# Patient Record
Sex: FEMALE | Race: OTHER | NOT HISPANIC OR LATINO
[De-identification: names, ages, dates, MRNs, and addresses within clinical notes are randomized per-mention and may not be internally consistent; named-entity substitution may affect disease eponyms.]

---

## 2017-03-28 ENCOUNTER — APPOINTMENT (OUTPATIENT)
Dept: FAMILY MEDICINE | Facility: CLINIC | Age: 52
End: 2017-03-28

## 2017-03-28 VITALS — SYSTOLIC BLOOD PRESSURE: 122 MMHG | DIASTOLIC BLOOD PRESSURE: 90 MMHG

## 2017-03-28 DIAGNOSIS — R53.81 OTHER MALAISE: ICD-10-CM

## 2017-03-29 LAB
ALBUMIN SERPL ELPH-MCNC: 4.6 G/DL
ALP BLD-CCNC: 76 U/L
ALT SERPL-CCNC: 22 U/L
ANION GAP SERPL CALC-SCNC: 19 MMOL/L
AST SERPL-CCNC: 18 U/L
BILIRUB SERPL-MCNC: 0.8 MG/DL
BUN SERPL-MCNC: 12 MG/DL
CALCIUM SERPL-MCNC: 10.3 MG/DL
CHLORIDE SERPL-SCNC: 103 MMOL/L
CO2 SERPL-SCNC: 25 MMOL/L
CREAT SERPL-MCNC: 0.8 MG/DL
GLUCOSE SERPL-MCNC: 136 MG/DL
LDLC SERPL DIRECT ASSAY-MCNC: 149 MG/DL
POTASSIUM SERPL-SCNC: 5.1 MMOL/L
PROT SERPL-MCNC: 7.5 G/DL
SODIUM SERPL-SCNC: 147 MMOL/L
TSH SERPL-ACNC: 1.5 UIU/ML

## 2017-07-31 ENCOUNTER — APPOINTMENT (OUTPATIENT)
Dept: FAMILY MEDICINE | Facility: CLINIC | Age: 52
End: 2017-07-31
Payer: COMMERCIAL

## 2017-07-31 VITALS
HEIGHT: 67 IN | DIASTOLIC BLOOD PRESSURE: 86 MMHG | SYSTOLIC BLOOD PRESSURE: 124 MMHG | BODY MASS INDEX: 27.31 KG/M2 | WEIGHT: 174 LBS

## 2017-07-31 PROCEDURE — 99213 OFFICE O/P EST LOW 20 MIN: CPT

## 2017-08-11 ENCOUNTER — EMERGENCY (EMERGENCY)
Facility: HOSPITAL | Age: 52
LOS: 0 days | Discharge: HOME | End: 2017-08-11
Attending: EMERGENCY MEDICINE
Payer: COMMERCIAL

## 2017-08-11 VITALS
WEIGHT: 171.96 LBS | HEART RATE: 68 BPM | SYSTOLIC BLOOD PRESSURE: 135 MMHG | RESPIRATION RATE: 16 BRPM | OXYGEN SATURATION: 100 % | TEMPERATURE: 99 F | DIASTOLIC BLOOD PRESSURE: 90 MMHG | HEIGHT: 67 IN
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
## 2017-08-11 DIAGNOSIS — S39.012A STRAIN OF MUSCLE, FASCIA AND TENDON OF LOWER BACK, INITIAL ENCOUNTER: ICD-10-CM

## 2017-08-11 DIAGNOSIS — Z98.890 OTHER SPECIFIED POSTPROCEDURAL STATES: Chronic | ICD-10-CM

## 2017-08-11 DIAGNOSIS — M54.9 DORSALGIA, UNSPECIFIED: ICD-10-CM

## 2017-08-11 PROCEDURE — 99284 EMERGENCY DEPT VISIT MOD MDM: CPT

## 2017-08-11 PROCEDURE — 72100 X-RAY EXAM L-S SPINE 2/3 VWS: CPT | Mod: 26

## 2017-08-11 RX ORDER — ACETAMINOPHEN 500 MG
650 TABLET ORAL ONCE
Qty: 0 | Refills: 0 | Status: COMPLETED | OUTPATIENT
Start: 2017-08-11 | End: 2017-08-11

## 2017-08-11 RX ADMIN — Medication 650 MILLIGRAM(S): at 19:12

## 2017-10-22 ENCOUNTER — APPOINTMENT (OUTPATIENT)
Dept: FAMILY MEDICINE | Facility: CLINIC | Age: 52
End: 2017-10-22

## 2017-11-03 ENCOUNTER — APPOINTMENT (OUTPATIENT)
Dept: FAMILY MEDICINE | Facility: CLINIC | Age: 52
End: 2017-11-03
Payer: COMMERCIAL

## 2017-11-03 VITALS
DIASTOLIC BLOOD PRESSURE: 80 MMHG | SYSTOLIC BLOOD PRESSURE: 122 MMHG | WEIGHT: 173 LBS | BODY MASS INDEX: 27.15 KG/M2 | HEIGHT: 67 IN

## 2017-11-03 PROCEDURE — G0008: CPT

## 2017-11-03 PROCEDURE — 36415 COLL VENOUS BLD VENIPUNCTURE: CPT

## 2017-11-03 PROCEDURE — 90688 IIV4 VACCINE SPLT 0.5 ML IM: CPT

## 2017-11-03 PROCEDURE — 99214 OFFICE O/P EST MOD 30 MIN: CPT | Mod: 25

## 2017-11-03 RX ORDER — NAPROXEN 500 MG/1
500 TABLET ORAL
Qty: 60 | Refills: 0 | Status: DISCONTINUED | COMMUNITY
Start: 2017-08-16

## 2017-11-03 RX ORDER — FLUOCINONIDE 0.05 MG/G
0.05 OINTMENT TOPICAL
Qty: 60 | Refills: 0 | Status: DISCONTINUED | COMMUNITY
Start: 2017-07-14

## 2017-11-03 RX ORDER — AZITHROMYCIN 250 MG/1
250 TABLET, FILM COATED ORAL
Qty: 6 | Refills: 0 | Status: DISCONTINUED | COMMUNITY
Start: 2017-06-03

## 2017-11-04 LAB
ALBUMIN SERPL ELPH-MCNC: 4.6 G/DL
ALP BLD-CCNC: 79 U/L
ALT SERPL-CCNC: 22 U/L
ANION GAP SERPL CALC-SCNC: 14 MMOL/L
AST SERPL-CCNC: 17 U/L
BASOPHILS # BLD AUTO: 0.03 K/UL
BASOPHILS NFR BLD AUTO: 0.4 %
BILIRUB SERPL-MCNC: 1.1 MG/DL
BUN SERPL-MCNC: 13 MG/DL
CALCIUM SERPL-MCNC: 10.2 MG/DL
CHLORIDE SERPL-SCNC: 99 MMOL/L
CHOLEST SERPL-MCNC: 222 MG/DL
CHOLEST/HDLC SERPL: 4 RATIO
CO2 SERPL-SCNC: 26 MMOL/L
CREAT SERPL-MCNC: 0.76 MG/DL
EOSINOPHIL # BLD AUTO: 0.13 K/UL
EOSINOPHIL NFR BLD AUTO: 1.8 %
GLUCOSE SERPL-MCNC: 95 MG/DL
HBA1C MFR BLD HPLC: 5.6 %
HCT VFR BLD CALC: 40.4 %
HDLC SERPL-MCNC: 56 MG/DL
HGB BLD-MCNC: 13.7 G/DL
IMM GRANULOCYTES NFR BLD AUTO: 0.3 %
LDLC SERPL CALC-MCNC: 147 MG/DL
LYMPHOCYTES # BLD AUTO: 2.82 K/UL
LYMPHOCYTES NFR BLD AUTO: 39.5 %
MAN DIFF?: NORMAL
MCHC RBC-ENTMCNC: 31.4 PG
MCHC RBC-ENTMCNC: 33.9 GM/DL
MCV RBC AUTO: 92.4 FL
MONOCYTES # BLD AUTO: 0.57 K/UL
MONOCYTES NFR BLD AUTO: 8 %
NEUTROPHILS # BLD AUTO: 3.57 K/UL
NEUTROPHILS NFR BLD AUTO: 50 %
PLATELET # BLD AUTO: 282 K/UL
POTASSIUM SERPL-SCNC: 4.6 MMOL/L
PROT SERPL-MCNC: 8 G/DL
RBC # BLD: 4.37 M/UL
RBC # FLD: 13 %
SODIUM SERPL-SCNC: 139 MMOL/L
TRIGL SERPL-MCNC: 94 MG/DL
TSH SERPL-ACNC: 1.91 UIU/ML
WBC # FLD AUTO: 7.14 K/UL

## 2018-02-09 ENCOUNTER — MEDICATION RENEWAL (OUTPATIENT)
Age: 53
End: 2018-02-09

## 2018-02-14 ENCOUNTER — APPOINTMENT (OUTPATIENT)
Dept: FAMILY MEDICINE | Facility: CLINIC | Age: 53
End: 2018-02-14

## 2018-04-17 ENCOUNTER — MEDICATION RENEWAL (OUTPATIENT)
Age: 53
End: 2018-04-17

## 2018-04-17 ENCOUNTER — APPOINTMENT (OUTPATIENT)
Dept: FAMILY MEDICINE | Facility: CLINIC | Age: 53
End: 2018-04-17
Payer: COMMERCIAL

## 2018-04-17 VITALS
BODY MASS INDEX: 27.31 KG/M2 | DIASTOLIC BLOOD PRESSURE: 90 MMHG | HEART RATE: 82 BPM | WEIGHT: 174 LBS | OXYGEN SATURATION: 97 % | SYSTOLIC BLOOD PRESSURE: 148 MMHG | HEIGHT: 67 IN

## 2018-04-17 LAB — CYTOLOGY CVX/VAG DOC THIN PREP: NORMAL

## 2018-04-17 PROCEDURE — 99214 OFFICE O/P EST MOD 30 MIN: CPT | Mod: 25

## 2018-04-17 PROCEDURE — 36415 COLL VENOUS BLD VENIPUNCTURE: CPT

## 2018-04-17 RX ORDER — AMOXICILLIN AND CLAVULANATE POTASSIUM 875; 125 MG/1; MG/1
875-125 TABLET, COATED ORAL
Qty: 20 | Refills: 0 | Status: DISCONTINUED | COMMUNITY
Start: 2018-03-26

## 2018-04-18 LAB
T4 FREE SERPL-MCNC: 1.5 NG/DL
TSH SERPL-ACNC: 2.11 UIU/ML

## 2018-05-01 ENCOUNTER — APPOINTMENT (OUTPATIENT)
Dept: FAMILY MEDICINE | Facility: CLINIC | Age: 53
End: 2018-05-01
Payer: COMMERCIAL

## 2018-05-01 VITALS
BODY MASS INDEX: 27.31 KG/M2 | SYSTOLIC BLOOD PRESSURE: 145 MMHG | WEIGHT: 174 LBS | DIASTOLIC BLOOD PRESSURE: 105 MMHG | HEIGHT: 67 IN

## 2018-05-01 PROCEDURE — 99214 OFFICE O/P EST MOD 30 MIN: CPT

## 2018-05-02 ENCOUNTER — APPOINTMENT (OUTPATIENT)
Dept: FAMILY MEDICINE | Facility: CLINIC | Age: 53
End: 2018-05-02

## 2018-05-02 ENCOUNTER — APPOINTMENT (OUTPATIENT)
Dept: FAMILY MEDICINE | Facility: CLINIC | Age: 53
End: 2018-05-02
Payer: COMMERCIAL

## 2018-05-02 VITALS
DIASTOLIC BLOOD PRESSURE: 80 MMHG | OXYGEN SATURATION: 97 % | HEIGHT: 67 IN | HEART RATE: 82 BPM | WEIGHT: 174 LBS | RESPIRATION RATE: 16 BRPM | SYSTOLIC BLOOD PRESSURE: 130 MMHG | BODY MASS INDEX: 27.31 KG/M2

## 2018-05-02 PROCEDURE — 99213 OFFICE O/P EST LOW 20 MIN: CPT

## 2018-05-14 ENCOUNTER — RX RENEWAL (OUTPATIENT)
Age: 53
End: 2018-05-14

## 2018-06-08 ENCOUNTER — MEDICATION RENEWAL (OUTPATIENT)
Age: 53
End: 2018-06-08

## 2018-07-20 RX ORDER — VALSARTAN 80 MG/1
80 TABLET, COATED ORAL
Qty: 90 | Refills: 1 | Status: DISCONTINUED | COMMUNITY
Start: 2018-05-01 | End: 2018-07-20

## 2018-08-03 ENCOUNTER — APPOINTMENT (OUTPATIENT)
Dept: FAMILY MEDICINE | Facility: CLINIC | Age: 53
End: 2018-08-03
Payer: COMMERCIAL

## 2018-08-03 VITALS
OXYGEN SATURATION: 98 % | WEIGHT: 173 LBS | BODY MASS INDEX: 27.15 KG/M2 | HEART RATE: 80 BPM | TEMPERATURE: 98.7 F | DIASTOLIC BLOOD PRESSURE: 100 MMHG | HEIGHT: 67 IN | SYSTOLIC BLOOD PRESSURE: 130 MMHG

## 2018-08-03 PROCEDURE — 99214 OFFICE O/P EST MOD 30 MIN: CPT

## 2018-08-03 NOTE — ASSESSMENT
[FreeTextEntry1] : The patient has a diagnosis of hypertension. Blood work was drawn in office and will be followed.  The diagnosis was discussed with patient and need for medication compliance and possible side affects and risks of noncompliance. Patient was told to adhere to a low salt diet and try to incorporate exercise daily.\par slightly elevated, will increase losartan to 100 and reassess in 3 weeks\par cardio referral\par \par anxiety - doing better - uses xanax only as needed\par \par reflux doing better

## 2018-08-03 NOTE — HISTORY OF PRESENT ILLNESS
[FreeTextEntry8] : 52 year old female here for blood pressure check and management after changing from valsartan to losartan.  patient also doing okay with xanax. Patient was placed on valsartan and told to stop the metoprolol. Patients active medications, allergies and issues were all reviewed with the patient at time of visit.\par

## 2018-08-03 NOTE — HEALTH RISK ASSESSMENT
[No falls in past year] : Patient reported no falls in the past year [0] : 2) Feeling down, depressed, or hopeless: Not at all (0) [] : No [[id number]] : 0

## 2018-08-03 NOTE — PHYSICAL EXAM
[Well Nourished] : well nourished [Normal Oropharynx] : the oropharynx was normal [Clear to Auscultation] : lungs were clear to auscultation bilaterally [Regular Rhythm] : with a regular rhythm [Soft] : abdomen soft [Non Tender] : non-tender [No CVA Tenderness] : no CVA  tenderness [No Joint Swelling] : no joint swelling [Normal Gait] : normal gait

## 2018-10-17 ENCOUNTER — APPOINTMENT (OUTPATIENT)
Dept: FAMILY MEDICINE | Facility: CLINIC | Age: 53
End: 2018-10-17
Payer: COMMERCIAL

## 2018-10-17 VITALS
SYSTOLIC BLOOD PRESSURE: 130 MMHG | WEIGHT: 173 LBS | RESPIRATION RATE: 16 BRPM | HEIGHT: 67 IN | HEART RATE: 114 BPM | OXYGEN SATURATION: 97 % | BODY MASS INDEX: 27.15 KG/M2 | DIASTOLIC BLOOD PRESSURE: 82 MMHG

## 2018-10-17 LAB
ALBUMIN SERPL ELPH-MCNC: 5 G/DL
ALP BLD-CCNC: 75 U/L
ALT SERPL-CCNC: 20 U/L
ANION GAP SERPL CALC-SCNC: 13 MMOL/L
AST SERPL-CCNC: 17 U/L
BASOPHILS # BLD AUTO: 0.02 K/UL
BASOPHILS NFR BLD AUTO: 0.3 %
BILIRUB SERPL-MCNC: 1.1 MG/DL
BUN SERPL-MCNC: 12 MG/DL
CALCIUM SERPL-MCNC: 10.9 MG/DL
CHLORIDE SERPL-SCNC: 102 MMOL/L
CHOLEST SERPL-MCNC: 211 MG/DL
CHOLEST/HDLC SERPL: 3.7 RATIO
CO2 SERPL-SCNC: 26 MMOL/L
CREAT SERPL-MCNC: 0.75 MG/DL
EOSINOPHIL # BLD AUTO: 0.04 K/UL
EOSINOPHIL NFR BLD AUTO: 0.5 %
GLUCOSE SERPL-MCNC: 101 MG/DL
HBA1C MFR BLD HPLC: 5.7 %
HCT VFR BLD CALC: 42.1 %
HDLC SERPL-MCNC: 57 MG/DL
HGB BLD-MCNC: 14.1 G/DL
IMM GRANULOCYTES NFR BLD AUTO: 0.1 %
LDLC SERPL CALC-MCNC: 136 MG/DL
LYMPHOCYTES # BLD AUTO: 2.06 K/UL
LYMPHOCYTES NFR BLD AUTO: 27.7 %
MAN DIFF?: NORMAL
MCHC RBC-ENTMCNC: 31.4 PG
MCHC RBC-ENTMCNC: 33.5 GM/DL
MCV RBC AUTO: 93.8 FL
MONOCYTES # BLD AUTO: 0.47 K/UL
MONOCYTES NFR BLD AUTO: 6.3 %
NEUTROPHILS # BLD AUTO: 4.83 K/UL
NEUTROPHILS NFR BLD AUTO: 65.1 %
PLATELET # BLD AUTO: 304 K/UL
POTASSIUM SERPL-SCNC: 4.3 MMOL/L
PROT SERPL-MCNC: 7.8 G/DL
RBC # BLD: 4.49 M/UL
RBC # FLD: 12.9 %
SODIUM SERPL-SCNC: 141 MMOL/L
TRIGL SERPL-MCNC: 90 MG/DL
TSH SERPL-ACNC: 2.35 UIU/ML
WBC # FLD AUTO: 7.43 K/UL

## 2018-10-17 PROCEDURE — 99214 OFFICE O/P EST MOD 30 MIN: CPT

## 2018-10-17 RX ORDER — AMLODIPINE BESYLATE 5 MG/1
5 TABLET ORAL
Qty: 21 | Refills: 0 | Status: DISCONTINUED | COMMUNITY
Start: 2018-10-03 | End: 2018-10-17

## 2018-10-17 NOTE — ASSESSMENT
[FreeTextEntry1] : The patient has a diagnosis of hypertension. Blood work was drawn in office and will be followed.  The diagnosis was discussed with patient and need for medication compliance and possible side affects and risks of noncompliance. Patient was told to adhere to a low salt diet and try to incorporate exercise daily.\par blood pressure well controlled\par doing well\par cardio referral\par \par anxiety - doing better - uses xanax only as needed\par \par reflux doing better

## 2018-10-17 NOTE — HEALTH RISK ASSESSMENT
[] : No [No falls in past year] : Patient reported no falls in the past year [0] : 2) Feeling down, depressed, or hopeless: Not at all (0) [[id number]] : 0

## 2018-10-17 NOTE — HISTORY OF PRESENT ILLNESS
[FreeTextEntry8] : 52 year old female here for blood pressure check and management after changing from valsartan to losartan.  patient also doing okay with xanax. patient did not like the amlodipin, made her dizzy. Patient was placed on valsartan and told to stop the metoprolol. Patients active medications, allergies and issues were all reviewed with the patient at time of visit.\par

## 2018-11-05 ENCOUNTER — LABORATORY RESULT (OUTPATIENT)
Age: 53
End: 2018-11-05

## 2018-11-05 ENCOUNTER — APPOINTMENT (OUTPATIENT)
Dept: FAMILY MEDICINE | Facility: CLINIC | Age: 53
End: 2018-11-05
Payer: COMMERCIAL

## 2018-11-05 VITALS
OXYGEN SATURATION: 99 % | RESPIRATION RATE: 16 BRPM | HEIGHT: 67 IN | HEART RATE: 92 BPM | SYSTOLIC BLOOD PRESSURE: 140 MMHG | BODY MASS INDEX: 25.43 KG/M2 | DIASTOLIC BLOOD PRESSURE: 76 MMHG | WEIGHT: 162 LBS

## 2018-11-05 DIAGNOSIS — Z92.29 PERSONAL HISTORY OF OTHER DRUG THERAPY: ICD-10-CM

## 2018-11-05 PROCEDURE — G0008: CPT

## 2018-11-05 PROCEDURE — 99214 OFFICE O/P EST MOD 30 MIN: CPT | Mod: 25

## 2018-11-05 PROCEDURE — 90686 IIV4 VACC NO PRSV 0.5 ML IM: CPT

## 2018-11-07 RX ORDER — ESCITALOPRAM OXALATE 10 MG/1
10 TABLET ORAL
Qty: 30 | Refills: 0 | Status: DISCONTINUED | COMMUNITY
Start: 2018-11-05 | End: 2018-11-07

## 2018-11-09 RX ORDER — SERTRALINE HYDROCHLORIDE 50 MG/1
50 TABLET, FILM COATED ORAL
Qty: 30 | Refills: 0 | Status: DISCONTINUED | COMMUNITY
Start: 2018-11-07 | End: 2018-11-09

## 2018-11-09 NOTE — PHYSICAL EXAM
[Well Nourished] : well nourished [Normal Oropharynx] : the oropharynx was normal [Clear to Auscultation] : lungs were clear to auscultation bilaterally [Regular Rhythm] : with a regular rhythm [Soft] : abdomen soft [Non Tender] : non-tender [No CVA Tenderness] : no CVA  tenderness [No Joint Swelling] : no joint swelling [Normal Gait] : normal gait [Normal Insight/Judgement] : insight and judgment were intact [de-identified] : anxious

## 2018-11-09 NOTE — HISTORY OF PRESENT ILLNESS
[FreeTextEntry8] : 52 year old female here for blood pressure check and management after changing from valsartan to losartan. Patient extremely nervous all day long.  patient using xanax as needed.  patient did not like the amlodipin, made her dizzy. Patient was placed on valsartan and told to stop the metoprolol. Patients active medications, allergies and issues were all reviewed with the patient at time of visit.\par

## 2018-11-09 NOTE — ASSESSMENT
[FreeTextEntry1] : The patient has a diagnosis of hypertension. Blood work was drawn in office and will be followed.  The diagnosis was discussed with patient and need for medication compliance and possible side affects and risks of noncompliance. Patient was told to adhere to a low salt diet and try to incorporate exercise daily.\par blood pressure well controlled\par doing well\par cardio referral\par \par Discussed diagnosis of anxiety and depression with the patient and potential outcomes/side affects of treatment versus non treatment. Medications were assessed and described at length. Side affects and black box warning were discussed.  Patient was advised to continue will all medications prescribed and the need for compliance was discussed and emphasized. Patient was advised to not stop medications without discussing with a health care provider first. Patient was advised to continue psychotherapy or seek therapy if not currently attending. Patient was educated on addictive potential of controlled substances and was counseled to use only as needed and sparingly. Patient verbalized understanding of all the above.\par patient extremely anxious\par will start lexapro and reassess in 4 weeks\par \par reflux doing better\par \par Patient was given a vaccine and was counseled regarding all side affects. Patient was advised to ice the area if necessary if it is tender or red. Patient was told to return to office if has any fever, nausea, vomiting or increased pain.\par

## 2018-12-03 ENCOUNTER — APPOINTMENT (OUTPATIENT)
Dept: FAMILY MEDICINE | Facility: CLINIC | Age: 53
End: 2018-12-03

## 2019-05-01 ENCOUNTER — APPOINTMENT (OUTPATIENT)
Dept: FAMILY MEDICINE | Facility: CLINIC | Age: 54
End: 2019-05-01
Payer: COMMERCIAL

## 2019-05-01 ENCOUNTER — NON-APPOINTMENT (OUTPATIENT)
Age: 54
End: 2019-05-01

## 2019-05-01 ENCOUNTER — LABORATORY RESULT (OUTPATIENT)
Age: 54
End: 2019-05-01

## 2019-05-01 VITALS
WEIGHT: 160 LBS | BODY MASS INDEX: 25.11 KG/M2 | DIASTOLIC BLOOD PRESSURE: 80 MMHG | HEIGHT: 67 IN | SYSTOLIC BLOOD PRESSURE: 130 MMHG

## 2019-05-01 DIAGNOSIS — K29.60 OTHER GASTRITIS W/OUT BLEEDING: ICD-10-CM

## 2019-05-01 LAB
25(OH)D3 SERPL-MCNC: 29.9 NG/ML
ALBUMIN SERPL ELPH-MCNC: 4.9 G/DL
ALP BLD-CCNC: 96 U/L
ALT SERPL-CCNC: 46 U/L
ANION GAP SERPL CALC-SCNC: 11 MMOL/L
AST SERPL-CCNC: 24 U/L
BILIRUB SERPL-MCNC: 1 MG/DL
BILIRUB UR QL STRIP: NEGATIVE
BUN SERPL-MCNC: 10 MG/DL
CALCIUM SERPL-MCNC: 11.2 MG/DL
CHLORIDE SERPL-SCNC: 101 MMOL/L
CHOLEST SERPL-MCNC: 218 MG/DL
CHOLEST/HDLC SERPL: 4.2 RATIO
CO2 SERPL-SCNC: 28 MMOL/L
CREAT SERPL-MCNC: 0.71 MG/DL
ESTIMATED AVERAGE GLUCOSE: 114 MG/DL
GLUCOSE SERPL-MCNC: 87 MG/DL
GLUCOSE UR-MCNC: NEGATIVE
HBA1C MFR BLD HPLC: 5.6 %
HCG UR QL: 0.2 EU/DL
HDLC SERPL-MCNC: 52 MG/DL
HGB UR QL STRIP.AUTO: NEGATIVE
KETONES UR-MCNC: NEGATIVE
LDLC SERPL CALC-MCNC: 145 MG/DL
LEUKOCYTE ESTERASE UR QL STRIP: NEGATIVE
NITRITE UR QL STRIP: NEGATIVE
PH UR STRIP: 7
POTASSIUM SERPL-SCNC: 4.7 MMOL/L
PROT SERPL-MCNC: 8.1 G/DL
PROT UR STRIP-MCNC: NEGATIVE
SODIUM SERPL-SCNC: 140 MMOL/L
SP GR UR STRIP: 1.01
TRIGL SERPL-MCNC: 105 MG/DL
TSH SERPL-ACNC: 0.5 UIU/ML

## 2019-05-01 PROCEDURE — 99396 PREV VISIT EST AGE 40-64: CPT | Mod: 25

## 2019-05-01 PROCEDURE — 99497 ADVNCD CARE PLAN 30 MIN: CPT

## 2019-05-01 PROCEDURE — 93000 ELECTROCARDIOGRAM COMPLETE: CPT

## 2019-05-01 PROCEDURE — 36415 COLL VENOUS BLD VENIPUNCTURE: CPT

## 2019-05-01 RX ORDER — VENLAFAXINE 75 MG/1
75 TABLET ORAL
Qty: 30 | Refills: 0 | Status: DISCONTINUED | COMMUNITY
Start: 2018-11-09 | End: 2019-05-01

## 2019-05-01 NOTE — PHYSICAL EXAM
[Well Nourished] : well nourished [Normal Oropharynx] : the oropharynx was normal [Clear to Auscultation] : lungs were clear to auscultation bilaterally [Soft] : abdomen soft [Regular Rhythm] : with a regular rhythm [No CVA Tenderness] : no CVA  tenderness [No Joint Swelling] : no joint swelling [Non Tender] : non-tender [Normal Insight/Judgement] : insight and judgment were intact [Normal Gait] : normal gait [de-identified] : anxious

## 2019-05-01 NOTE — HEALTH RISK ASSESSMENT
[Good] : ~his/her~  mood as  good [No falls in past year] : Patient reported no falls in the past year [0] : 2) Feeling down, depressed, or hopeless: Not at all (0) [Patient reported mammogram was normal] : Patient reported mammogram was normal [Patient reported colonoscopy was normal] : Patient reported colonoscopy was normal [Alone] : lives alone [Employed] : employed [# Of Children ___] : has [unfilled] children [Single] : single [Fully functional (bathing, dressing, toileting, transferring, walking, feeding)] : Fully functional (bathing, dressing, toileting, transferring, walking, feeding) [Fully functional (using the telephone, shopping, preparing meals, housekeeping, doing laundry, using] : Fully functional and needs no help or supervision to perform IADLs (using the telephone, shopping, preparing meals, housekeeping, doing laundry, using transportation, managing medications and managing finances) [Smoke Detector] : smoke detector [Seat Belt] :  uses seat belt [With Patient/Caregiver] : With Patient/Caregiver [] : No [[id number]] : 0 [MammogramDate] : 7/2018 [PapSmearDate] : 7/2018 [ColonoscopyComments] : fir test 2018 [de-identified] :  [AdvancecareDate] : 05/01/2019 [FreeTextEntry4] : patient does not have health care proxy\par will consider later on

## 2019-05-01 NOTE — HISTORY OF PRESENT ILLNESS
[FreeTextEntry8] : 53 year old female  here for annual well visit. Patient's blood work was drawn and medications reviewed. Patient's past medical history was reviewed, allergies verified and problems were identified and assessed. Patients medications were reviewed. Patient is feeling well with no new or active complaints at this time.\par \par Patient  patient using xanax as needed.  patient did not like the amlodipin, made her dizzy. Patient was placed on valsartan and told to stop the metoprolol. Patients active medications, allergies and issues were all reviewed with the patient at time of visit.\par

## 2019-05-01 NOTE — ASSESSMENT
[FreeTextEntry1] : Patient was counseled on healthy eating habits, on daily exercise and stress relief. All medications and allergies were reviewed with the patient and any adjustments necessary were made. Patient was counseled to try engage in an exercise activity for at least 30 minutes 3-4 times a week.  Patient was advised to eat a diet low in fat and carbohydrates and high in protein, with plenty of vegetables. Patient was advised to try and engage in relaxing activities whenever possible.\par The patients blood was draw in office and will be followed and assessed for any issues.  Patient was told to return to the office if any issues arise.  Unless otherwise stated, the patient is to continue all other medications as previously prescribed.\par \par had fit test, does not want colonoscopy\par \par The patient has a diagnosis of hypertension. Blood work was drawn in office and will be followed.  The diagnosis was discussed with patient and need for medication compliance and possible side affects and risks of noncompliance. Patient was told to adhere to a low salt diet and try to incorporate exercise daily.\par blood pressure well controlled\par doing well\par \par Discussed diagnosis of anxiety and depression with the patient and potential outcomes/side affects of treatment versus non treatment. Medications were assessed and described at length. Side affects and black box warning were discussed.  Patient was advised to continue will all medications prescribed and the need for compliance was discussed and emphasized. Patient was advised to not stop medications without discussing with a health care provider first. Patient was advised to continue psychotherapy or seek therapy if not currently attending. Patient was educated on addictive potential of controlled substances and was counseled to use only as needed and sparingly. Patient verbalized understanding of all the above.\par doing much better\par \par reflux doing better\par \par

## 2019-05-02 LAB
BASOPHILS # BLD AUTO: 0.05 K/UL
BASOPHILS NFR BLD AUTO: 0.6 %
EOSINOPHIL # BLD AUTO: 0.09 K/UL
EOSINOPHIL NFR BLD AUTO: 1 %
HCT VFR BLD CALC: 42.1 %
HGB BLD-MCNC: 13.5 G/DL
IMM GRANULOCYTES NFR BLD AUTO: 0.3 %
LYMPHOCYTES # BLD AUTO: 2.59 K/UL
LYMPHOCYTES NFR BLD AUTO: 28.8 %
MAN DIFF?: NORMAL
MCHC RBC-ENTMCNC: 30.5 PG
MCHC RBC-ENTMCNC: 32.1 GM/DL
MCV RBC AUTO: 95 FL
MONOCYTES # BLD AUTO: 0.73 K/UL
MONOCYTES NFR BLD AUTO: 8.1 %
NEUTROPHILS # BLD AUTO: 5.51 K/UL
NEUTROPHILS NFR BLD AUTO: 61.2 %
PLATELET # BLD AUTO: 307 K/UL
RBC # BLD: 4.43 M/UL
RBC # FLD: 12.4 %
WBC # FLD AUTO: 9 K/UL

## 2019-05-04 ENCOUNTER — TRANSCRIPTION ENCOUNTER (OUTPATIENT)
Age: 54
End: 2019-05-04

## 2019-05-05 LAB
BACTERIA STL CULT: NORMAL
DEPRECATED O AND P PREP STL: NORMAL

## 2019-10-30 ENCOUNTER — APPOINTMENT (OUTPATIENT)
Dept: FAMILY MEDICINE | Facility: CLINIC | Age: 54
End: 2019-10-30
Payer: COMMERCIAL

## 2019-10-30 VITALS
HEIGHT: 67 IN | DIASTOLIC BLOOD PRESSURE: 90 MMHG | SYSTOLIC BLOOD PRESSURE: 139 MMHG | TEMPERATURE: 97.9 F | BODY MASS INDEX: 24.96 KG/M2 | OXYGEN SATURATION: 95 % | HEART RATE: 91 BPM | WEIGHT: 159 LBS

## 2019-10-30 PROCEDURE — G0008: CPT

## 2019-10-30 PROCEDURE — 90674 CCIIV4 VAC NO PRSV 0.5 ML IM: CPT

## 2019-10-30 PROCEDURE — 99214 OFFICE O/P EST MOD 30 MIN: CPT | Mod: 25

## 2019-10-30 NOTE — HEALTH RISK ASSESSMENT
[No falls in past year] : Patient reported no falls in the past year [0] : 2) Feeling down, depressed, or hopeless: Not at all (0) [Good] : ~his/her~  mood as  good [Patient reported mammogram was normal] : Patient reported mammogram was normal [Patient reported colonoscopy was normal] : Patient reported colonoscopy was normal [Alone] : lives alone [Employed] : employed [Single] : single [# Of Children ___] : has [unfilled] children [Fully functional (bathing, dressing, toileting, transferring, walking, feeding)] : Fully functional (bathing, dressing, toileting, transferring, walking, feeding) [Fully functional (using the telephone, shopping, preparing meals, housekeeping, doing laundry, using] : Fully functional and needs no help or supervision to perform IADLs (using the telephone, shopping, preparing meals, housekeeping, doing laundry, using transportation, managing medications and managing finances) [Smoke Detector] : smoke detector [Seat Belt] :  uses seat belt [With Patient/Caregiver] : With Patient/Caregiver [] : No [[id number]] : 0 [MammogramDate] : 7/2019 [PapSmearDate] : 7/2019 [ColonoscopyComments] : fir test 2018 [de-identified] :  [AdvancecareDate] : 05/01/2019 [FreeTextEntry4] : patient does not have health care proxy\par will consider later on

## 2019-10-30 NOTE — HISTORY OF PRESENT ILLNESS
[FreeTextEntry8] : 54 year old female is here for a followup visit. Patient is here for medication renewals and for blood work discussion. Medications and allergies were reviewed and assessed.  There has been no new medications since the last visit. Patient is feeling well with no active changes or issues since Her last visit.\par \par Patient  patient using xanax as needed.  patient did not like the amlodipine, made her dizzy. Patient was placed on valsartan and told to stop the metoprolol. Patients active medications, allergies and issues were all reviewed with the patient at time of visit.\par

## 2019-10-30 NOTE — PHYSICAL EXAM
[Well Nourished] : well nourished [Normal Oropharynx] : the oropharynx was normal [Clear to Auscultation] : lungs were clear to auscultation bilaterally [Regular Rhythm] : with a regular rhythm [Soft] : abdomen soft [Non Tender] : non-tender [No CVA Tenderness] : no CVA  tenderness [No Joint Swelling] : no joint swelling [Normal Gait] : normal gait [Normal Insight/Judgement] : insight and judgment were intact [de-identified] : anxious

## 2019-10-30 NOTE — ASSESSMENT
[FreeTextEntry1] : hypertension\par The patient has a diagnosis of hypertension. The diagnosis was discussed with patient and need for medication compliance and possible side affects and risks of noncompliance. Patient was told to adhere to a low salt diet and try to incorporate exercise daily.\par \par elevated calcium\par went to endo, started on vitamin d, going for bone density\par \par had fit test, does not want colonoscopy\par \par anxiety \par Discussed diagnosis of anxiety with the patient and potential outcomes/side affects of treatment versus non treatment. Medications were assessed and described at length. Side affects and black box warning were discussed.  Patient was advised to continue will all medications prescribed and the need for compliance was discussed and emphasized. Patient was advised to not stop medications without discussing with a health care provider first. Patient was advised to continue psychotherapy or seek therapy if not currently attending. Patient was educated on addictive potential of controlled substances and was counseled to use only as needed and sparingly. Patient verbalized understanding of all the above.\par doing much better\par \par reflux doing better\par \par

## 2020-03-18 ENCOUNTER — RX RENEWAL (OUTPATIENT)
Age: 55
End: 2020-03-18

## 2020-04-21 ENCOUNTER — RX RENEWAL (OUTPATIENT)
Age: 55
End: 2020-04-21

## 2020-04-30 ENCOUNTER — LABORATORY RESULT (OUTPATIENT)
Age: 55
End: 2020-04-30

## 2020-04-30 ENCOUNTER — TRANSCRIPTION ENCOUNTER (OUTPATIENT)
Age: 55
End: 2020-04-30

## 2020-04-30 ENCOUNTER — APPOINTMENT (OUTPATIENT)
Dept: FAMILY MEDICINE | Facility: CLINIC | Age: 55
End: 2020-04-30
Payer: COMMERCIAL

## 2020-04-30 VITALS — SYSTOLIC BLOOD PRESSURE: 150 MMHG | DIASTOLIC BLOOD PRESSURE: 90 MMHG

## 2020-04-30 DIAGNOSIS — B00.1 HERPESVIRAL VESICULAR DERMATITIS: ICD-10-CM

## 2020-04-30 LAB — CYTOLOGY CVX/VAG DOC THIN PREP: NORMAL

## 2020-04-30 PROCEDURE — 99214 OFFICE O/P EST MOD 30 MIN: CPT | Mod: 25

## 2020-04-30 PROCEDURE — 36415 COLL VENOUS BLD VENIPUNCTURE: CPT

## 2020-04-30 NOTE — PHYSICAL EXAM
[Well Nourished] : well nourished [No Acute Distress] : no acute distress [EOMI] : extraocular movements intact [Normal Sclera/Conjunctiva] : normal sclera/conjunctiva [Normal Outer Ear/Nose] : the outer ears and nose were normal in appearance [No Respiratory Distress] : no respiratory distress  [Coordination Grossly Intact] : coordination grossly intact [No Accessory Muscle Use] : no accessory muscle use [Normal Affect] : the affect was normal [Alert and Oriented x3] : oriented to person, place, and time [Normal Insight/Judgement] : insight and judgment were intact [de-identified] : anxious

## 2020-04-30 NOTE — HISTORY OF PRESENT ILLNESS
[FreeTextEntry8] : needs bp chk and work form filled out\par  for school, just had insurance changed\par has been checking bp at home, 137/72\par feeling anxious\par also needs med refills\par had been on metoprolol in the past, states didn't work

## 2020-05-01 DIAGNOSIS — Z86.39 PERSONAL HISTORY OF OTHER ENDOCRINE, NUTRITIONAL AND METABOLIC DISEASE: ICD-10-CM

## 2020-05-01 LAB
ALBUMIN SERPL ELPH-MCNC: 4.8 G/DL
ALP BLD-CCNC: 79 U/L
ALT SERPL-CCNC: 20 U/L
ANION GAP SERPL CALC-SCNC: 10 MMOL/L
AST SERPL-CCNC: 15 U/L
BASOPHILS # BLD AUTO: 0.05 K/UL
BASOPHILS NFR BLD AUTO: 0.6 %
BILIRUB SERPL-MCNC: 0.9 MG/DL
BUN SERPL-MCNC: 11 MG/DL
CALCIUM SERPL-MCNC: 11.1 MG/DL
CHLORIDE SERPL-SCNC: 104 MMOL/L
CHOLEST SERPL-MCNC: 216 MG/DL
CHOLEST/HDLC SERPL: 3.9 RATIO
CO2 SERPL-SCNC: 26 MMOL/L
CREAT SERPL-MCNC: 0.7 MG/DL
EOSINOPHIL # BLD AUTO: 0.07 K/UL
EOSINOPHIL NFR BLD AUTO: 0.9 %
GLUCOSE SERPL-MCNC: 103 MG/DL
HCT VFR BLD CALC: 40.1 %
HDLC SERPL-MCNC: 56 MG/DL
HGB BLD-MCNC: 12.8 G/DL
IMM GRANULOCYTES NFR BLD AUTO: 0.1 %
LDLC SERPL CALC-MCNC: 131 MG/DL
LDLC SERPL DIRECT ASSAY-MCNC: 141 MG/DL
LYMPHOCYTES # BLD AUTO: 2.51 K/UL
LYMPHOCYTES NFR BLD AUTO: 31 %
MAN DIFF?: NORMAL
MCHC RBC-ENTMCNC: 30.6 PG
MCHC RBC-ENTMCNC: 31.9 GM/DL
MCV RBC AUTO: 95.9 FL
MONOCYTES # BLD AUTO: 0.68 K/UL
MONOCYTES NFR BLD AUTO: 8.4 %
NEUTROPHILS # BLD AUTO: 4.77 K/UL
NEUTROPHILS NFR BLD AUTO: 59 %
PLATELET # BLD AUTO: 258 K/UL
POTASSIUM SERPL-SCNC: 4.7 MMOL/L
PROT SERPL-MCNC: 7.4 G/DL
RBC # BLD: 4.18 M/UL
RBC # FLD: 12.2 %
SODIUM SERPL-SCNC: 141 MMOL/L
TRIGL SERPL-MCNC: 141 MG/DL
TSH SERPL-ACNC: 7.68 UIU/ML
WBC # FLD AUTO: 8.09 K/UL

## 2020-05-04 ENCOUNTER — APPOINTMENT (OUTPATIENT)
Dept: FAMILY MEDICINE | Facility: CLINIC | Age: 55
End: 2020-05-04
Payer: MEDICAID

## 2020-05-04 VITALS
HEIGHT: 67 IN | SYSTOLIC BLOOD PRESSURE: 128 MMHG | OXYGEN SATURATION: 97 % | HEART RATE: 110 BPM | DIASTOLIC BLOOD PRESSURE: 86 MMHG

## 2020-05-04 LAB
25(OH)D3 SERPL-MCNC: 50.5 NG/ML
CALCIUM SERPL-MCNC: 11.1 MG/DL
PARATHYROID HORMONE INTACT: 38 PG/ML

## 2020-05-04 PROCEDURE — 99214 OFFICE O/P EST MOD 30 MIN: CPT

## 2020-05-04 NOTE — PHYSICAL EXAM
[Well Nourished] : well nourished [Normal Oropharynx] : the oropharynx was normal [Regular Rhythm] : with a regular rhythm [Clear to Auscultation] : lungs were clear to auscultation bilaterally [Soft] : abdomen soft [No CVA Tenderness] : no CVA  tenderness [Non Tender] : non-tender [Normal Gait] : normal gait [No Joint Swelling] : no joint swelling [Normal Insight/Judgement] : insight and judgment were intact [de-identified] : anxious

## 2020-05-04 NOTE — HISTORY OF PRESENT ILLNESS
[FreeTextEntry8] : 54 year old female is here for a followup visit. Patient is here for medication renewals and for blood work discussion. Medications and allergies were reviewed and assessed.  There has been no new medications since the last visit. Patient is feeling well with no active changes or issues since Her last visit.\par \par Patient  patient using xanax as needed.

## 2020-05-04 NOTE — ASSESSMENT
[FreeTextEntry1] : hypertension\par The patient has a diagnosis of hypertension. The diagnosis was discussed with patient and need for medication compliance and possible side affects and risks of noncompliance. Patient was told to adhere to a low salt diet and try to incorporate exercise daily.\par reevaluated today, better controlled, doing good\par \par elevated calcium/PTH\par reviewed by with patient, PTH high as well\par must fu with endo\par \par THYROID DYSFUNCTION\par Started on synthroid, must be rechecked in 6 weeks\par \par had fit test, does not want colonoscopy\par \par anxiety \par Discussed diagnosis of anxiety with the patient and potential outcomes/side affects of treatment versus non treatment. Medications were assessed and described at length. Side affects and black box warning were discussed.  Patient was advised to continue will all medications prescribed and the need for compliance was discussed and emphasized. Patient was advised to not stop medications without discussing with a health care provider first. Patient was advised to continue psychotherapy or seek therapy if not currently attending. Patient was educated on addictive potential of controlled substances and was counseled to use only as needed and sparingly. Patient verbalized understanding of all the above.\par doing much better\par \par reflux doing better\par \par

## 2020-05-04 NOTE — HEALTH RISK ASSESSMENT
[] : No [No falls in past year] : Patient reported no falls in the past year [0] : 2) Feeling down, depressed, or hopeless: Not at all (0) [[id number]] : 0 [Good] : ~his/her~  mood as  good [Patient reported mammogram was normal] : Patient reported mammogram was normal [Patient reported colonoscopy was normal] : Patient reported colonoscopy was normal [Alone] : lives alone [# Of Children ___] : has [unfilled] children [Employed] : employed [Single] : single [Fully functional (bathing, dressing, toileting, transferring, walking, feeding)] : Fully functional (bathing, dressing, toileting, transferring, walking, feeding) [Fully functional (using the telephone, shopping, preparing meals, housekeeping, doing laundry, using] : Fully functional and needs no help or supervision to perform IADLs (using the telephone, shopping, preparing meals, housekeeping, doing laundry, using transportation, managing medications and managing finances) [Smoke Detector] : smoke detector [Seat Belt] :  uses seat belt [MammogramDate] : 7/2019 [PapSmearDate] : 7/2019 [ColonoscopyComments] : fir test 2018 [de-identified] :  [With Patient/Caregiver] : With Patient/Caregiver [AdvancecareDate] : 05/01/2019 [FreeTextEntry4] : patient does not have health care proxy\par will consider later on

## 2020-06-19 ENCOUNTER — APPOINTMENT (OUTPATIENT)
Dept: FAMILY MEDICINE | Facility: CLINIC | Age: 55
End: 2020-06-19
Payer: MEDICAID

## 2020-06-19 ENCOUNTER — LABORATORY RESULT (OUTPATIENT)
Age: 55
End: 2020-06-19

## 2020-06-19 VITALS
OXYGEN SATURATION: 99 % | SYSTOLIC BLOOD PRESSURE: 120 MMHG | BODY MASS INDEX: 24.96 KG/M2 | HEIGHT: 67 IN | WEIGHT: 159 LBS | TEMPERATURE: 97.7 F | DIASTOLIC BLOOD PRESSURE: 80 MMHG | HEART RATE: 78 BPM

## 2020-06-19 DIAGNOSIS — Z11.59 ENCOUNTER FOR SCREENING FOR OTHER VIRAL DISEASES: ICD-10-CM

## 2020-06-19 LAB
25(OH)D3 SERPL-MCNC: 50.5 NG/ML
APPEARANCE: CLEAR
BASOPHILS # BLD AUTO: 0.05 K/UL
BASOPHILS NFR BLD AUTO: 0.7 %
BILIRUBIN URINE: NEGATIVE
BLOOD URINE: NEGATIVE
COLOR: COLORLESS
EOSINOPHIL # BLD AUTO: 0.12 K/UL
EOSINOPHIL NFR BLD AUTO: 1.8 %
ESTIMATED AVERAGE GLUCOSE: 111 MG/DL
GLUCOSE QUALITATIVE U: NEGATIVE
HBA1C MFR BLD HPLC: 5.5 %
HCT VFR BLD CALC: 39.6 %
HGB BLD-MCNC: 12.4 G/DL
IMM GRANULOCYTES NFR BLD AUTO: 0.1 %
KETONES URINE: NEGATIVE
LEUKOCYTE ESTERASE URINE: NEGATIVE
LYMPHOCYTES # BLD AUTO: 3.01 K/UL
LYMPHOCYTES NFR BLD AUTO: 44 %
MAN DIFF?: NORMAL
MCHC RBC-ENTMCNC: 30.5 PG
MCHC RBC-ENTMCNC: 31.3 GM/DL
MCV RBC AUTO: 97.3 FL
MONOCYTES # BLD AUTO: 0.6 K/UL
MONOCYTES NFR BLD AUTO: 8.8 %
NEUTROPHILS # BLD AUTO: 3.05 K/UL
NEUTROPHILS NFR BLD AUTO: 44.6 %
NITRITE URINE: NEGATIVE
PH URINE: 7
PLATELET # BLD AUTO: 241 K/UL
PROTEIN URINE: NEGATIVE
RBC # BLD: 4.07 M/UL
RBC # FLD: 12.1 %
SPECIFIC GRAVITY URINE: 1.01
TSH SERPL-ACNC: 5.55 UIU/ML
UROBILINOGEN URINE: NORMAL
WBC # FLD AUTO: 6.84 K/UL

## 2020-06-19 PROCEDURE — 99396 PREV VISIT EST AGE 40-64: CPT | Mod: 25

## 2020-06-19 PROCEDURE — 36415 COLL VENOUS BLD VENIPUNCTURE: CPT

## 2020-06-19 NOTE — HISTORY OF PRESENT ILLNESS
[FreeTextEntry8] : 54 year old female  here for annual well visit. Patient's blood work was drawn and medications reviewed. Patient's past medical history was reviewed, allergies verified and problems were identified and assessed. Patients medications were reviewed. Patient is feeling well with no new or active complaints at this time.\par \par \par Patient  patient using xanax as needed.

## 2020-06-19 NOTE — HEALTH RISK ASSESSMENT
[Good] : ~his/her~ current health as good [] : No [Yes] : Yes [No falls in past year] : Patient reported no falls in the past year [[id number]] : 0 [0] : 2) Feeling down, depressed, or hopeless: Not at all (0) [Patient reported colonoscopy was normal] : Patient reported colonoscopy was normal [Alone] : lives alone [Patient reported mammogram was normal] : Patient reported mammogram was normal [Employed] : employed [# Of Children ___] : has [unfilled] children [Single] : single [Smoke Detector] : smoke detector [Fully functional (using the telephone, shopping, preparing meals, housekeeping, doing laundry, using] : Fully functional and needs no help or supervision to perform IADLs (using the telephone, shopping, preparing meals, housekeeping, doing laundry, using transportation, managing medications and managing finances) [Fully functional (bathing, dressing, toileting, transferring, walking, feeding)] : Fully functional (bathing, dressing, toileting, transferring, walking, feeding) [PapSmearDate] : 7/2019 [MammogramDate] : 7/2019 [Seat Belt] :  uses seat belt [de-identified] :  [ColonoscopyComments] : fit test 2018 [With Patient/Caregiver] : With Patient/Caregiver [FreeTextEntry4] : patient does not have health care proxy\par will consider later on [AdvancecareDate] : 05/01/2019

## 2020-06-19 NOTE — PHYSICAL EXAM
[Well Nourished] : well nourished [Normal Oropharynx] : the oropharynx was normal [Clear to Auscultation] : lungs were clear to auscultation bilaterally [Regular Rhythm] : with a regular rhythm [Soft] : abdomen soft [Non Tender] : non-tender [No CVA Tenderness] : no CVA  tenderness [No Joint Swelling] : no joint swelling [Normal Gait] : normal gait [Normal Insight/Judgement] : insight and judgment were intact [de-identified] : anxious

## 2020-06-20 LAB
ALBUMIN SERPL ELPH-MCNC: 4.8 G/DL
ALP BLD-CCNC: 75 U/L
ALT SERPL-CCNC: 24 U/L
ANION GAP SERPL CALC-SCNC: 14 MMOL/L
AST SERPL-CCNC: 19 U/L
BILIRUB SERPL-MCNC: 1 MG/DL
BUN SERPL-MCNC: 14 MG/DL
CALCIUM SERPL-MCNC: 10.8 MG/DL
CALCIUM SERPL-MCNC: 10.8 MG/DL
CHLORIDE SERPL-SCNC: 100 MMOL/L
CHOLEST SERPL-MCNC: 187 MG/DL
CHOLEST/HDLC SERPL: 3.8 RATIO
CO2 SERPL-SCNC: 26 MMOL/L
CREAT SERPL-MCNC: 0.75 MG/DL
GLUCOSE SERPL-MCNC: 91 MG/DL
HDLC SERPL-MCNC: 49 MG/DL
LDLC SERPL CALC-MCNC: 115 MG/DL
PARATHYROID HORMONE INTACT: 57 PG/ML
POTASSIUM SERPL-SCNC: 4.3 MMOL/L
PROT SERPL-MCNC: 6.8 G/DL
SARS-COV-2 IGG SERPL IA-ACNC: 0.01 INDEX
SARS-COV-2 IGG SERPL QL IA: NEGATIVE
SODIUM SERPL-SCNC: 139 MMOL/L
TRIGL SERPL-MCNC: 116 MG/DL

## 2020-08-04 ENCOUNTER — APPOINTMENT (OUTPATIENT)
Dept: FAMILY MEDICINE | Facility: CLINIC | Age: 55
End: 2020-08-04
Payer: MEDICAID

## 2020-08-04 VITALS
OXYGEN SATURATION: 98 % | DIASTOLIC BLOOD PRESSURE: 88 MMHG | SYSTOLIC BLOOD PRESSURE: 120 MMHG | HEART RATE: 97 BPM | HEIGHT: 67 IN

## 2020-08-04 PROCEDURE — 36415 COLL VENOUS BLD VENIPUNCTURE: CPT

## 2020-08-04 PROCEDURE — 99214 OFFICE O/P EST MOD 30 MIN: CPT | Mod: 25

## 2020-08-04 RX ORDER — KETOCONAZOLE 20.5 MG/ML
2 SHAMPOO, SUSPENSION TOPICAL
Qty: 120 | Refills: 0 | Status: DISCONTINUED | COMMUNITY
Start: 2019-11-29 | End: 2020-08-04

## 2020-08-04 RX ORDER — CLINDAMYCIN PHOSPHATE 10 MG/ML
1 SOLUTION TOPICAL
Qty: 60 | Refills: 0 | Status: DISCONTINUED | COMMUNITY
Start: 2020-04-21 | End: 2020-08-04

## 2020-08-04 RX ORDER — OMEPRAZOLE 40 MG/1
40 CAPSULE, DELAYED RELEASE ORAL
Qty: 30 | Refills: 0 | Status: DISCONTINUED | COMMUNITY
Start: 2018-04-17 | End: 2020-08-04

## 2020-08-04 RX ORDER — FLUTICASONE PROPIONATE 50 UG/1
50 SPRAY, METERED NASAL TWICE DAILY
Qty: 1 | Refills: 0 | Status: DISCONTINUED | COMMUNITY
Start: 2020-04-30 | End: 2020-08-04

## 2020-08-04 RX ORDER — FLUOCINONIDE 0.5 MG/ML
0.05 SOLUTION TOPICAL
Qty: 60 | Refills: 0 | Status: DISCONTINUED | COMMUNITY
Start: 2019-11-29 | End: 2020-08-04

## 2020-08-04 RX ORDER — LOSARTAN POTASSIUM AND HYDROCHLOROTHIAZIDE 12.5; 1 MG/1; MG/1
100-12.5 TABLET ORAL DAILY
Qty: 90 | Refills: 0 | Status: DISCONTINUED | COMMUNITY
Start: 2020-04-30 | End: 2020-08-04

## 2020-08-04 RX ORDER — BETAMETHASONE DIPROPIONATE 0.5 MG/G
0.05 LOTION TOPICAL
Qty: 60 | Refills: 0 | Status: DISCONTINUED | COMMUNITY
Start: 2020-02-20 | End: 2020-08-04

## 2020-08-04 NOTE — HEALTH RISK ASSESSMENT
[] : No [Yes] : Yes [No falls in past year] : Patient reported no falls in the past year [0] : 2) Feeling down, depressed, or hopeless: Not at all (0) [[id number]] : 0 [Good] : ~his/her~  mood as  good [Patient reported mammogram was normal] : Patient reported mammogram was normal [Patient reported colonoscopy was normal] : Patient reported colonoscopy was normal [Alone] : lives alone [Employed] : employed [Single] : single [# Of Children ___] : has [unfilled] children [Fully functional (bathing, dressing, toileting, transferring, walking, feeding)] : Fully functional (bathing, dressing, toileting, transferring, walking, feeding) [Smoke Detector] : smoke detector [Fully functional (using the telephone, shopping, preparing meals, housekeeping, doing laundry, using] : Fully functional and needs no help or supervision to perform IADLs (using the telephone, shopping, preparing meals, housekeeping, doing laundry, using transportation, managing medications and managing finances) [MammogramDate] : 7/2019 [Seat Belt] :  uses seat belt [PapSmearDate] : 7/2019 [de-identified] :  [ColonoscopyComments] : fit test 2018 [With Patient/Caregiver] : With Patient/Caregiver [AdvancecareDate] : 05/01/2019 [FreeTextEntry4] : patient does not have health care proxy\par will consider later on

## 2020-08-04 NOTE — HISTORY OF PRESENT ILLNESS
[FreeTextEntry8] : 55 year old female is here for a followup visit. Patient is here for medication renewals and for blood work discussion. Medications and allergies were reviewed and assessed.  There has been no new medications since the last visit. Patient is feeling well with no active changes or issues since Her last visit.\par \par Patient  patient using xanax as needed.

## 2020-08-04 NOTE — PHYSICAL EXAM
[Well Nourished] : well nourished [Normal Oropharynx] : the oropharynx was normal [Clear to Auscultation] : lungs were clear to auscultation bilaterally [Regular Rhythm] : with a regular rhythm [Non Tender] : non-tender [Soft] : abdomen soft [Normal Gait] : normal gait [Normal Insight/Judgement] : insight and judgment were intact [de-identified] : anxious

## 2020-08-05 LAB
CHOLEST SERPL-MCNC: 222 MG/DL
CHOLEST/HDLC SERPL: 3.9 RATIO
HDLC SERPL-MCNC: 57 MG/DL
LDLC SERPL CALC-MCNC: 145 MG/DL
TRIGL SERPL-MCNC: 100 MG/DL
TSH SERPL-ACNC: 2.73 UIU/ML

## 2020-09-14 ENCOUNTER — APPOINTMENT (OUTPATIENT)
Dept: ENDOCRINOLOGY | Facility: CLINIC | Age: 55
End: 2020-09-14

## 2020-11-09 ENCOUNTER — APPOINTMENT (OUTPATIENT)
Dept: FAMILY MEDICINE | Facility: CLINIC | Age: 55
End: 2020-11-09
Payer: COMMERCIAL

## 2020-11-09 VITALS
SYSTOLIC BLOOD PRESSURE: 138 MMHG | OXYGEN SATURATION: 98 % | HEART RATE: 96 BPM | WEIGHT: 161 LBS | HEIGHT: 67 IN | BODY MASS INDEX: 25.27 KG/M2 | DIASTOLIC BLOOD PRESSURE: 82 MMHG

## 2020-11-09 DIAGNOSIS — Z23 ENCOUNTER FOR IMMUNIZATION: ICD-10-CM

## 2020-11-09 DIAGNOSIS — R59.9 ENLARGED LYMPH NODES, UNSPECIFIED: ICD-10-CM

## 2020-11-09 PROCEDURE — G0008: CPT

## 2020-11-09 PROCEDURE — 90686 IIV4 VACC NO PRSV 0.5 ML IM: CPT

## 2020-11-09 PROCEDURE — 99072 ADDL SUPL MATRL&STAF TM PHE: CPT

## 2020-11-09 PROCEDURE — 99214 OFFICE O/P EST MOD 30 MIN: CPT | Mod: 25

## 2020-11-09 RX ORDER — LEVOTHYROXINE SODIUM 0.05 MG/1
50 TABLET ORAL
Qty: 90 | Refills: 0 | Status: COMPLETED | COMMUNITY
Start: 2020-05-28

## 2020-11-09 NOTE — PHYSICAL EXAM
[Well Nourished] : well nourished [Normal Oropharynx] : the oropharynx was normal [Clear to Auscultation] : lungs were clear to auscultation bilaterally [Regular Rhythm] : with a regular rhythm [Soft] : abdomen soft [Non Tender] : non-tender [Normal Gait] : normal gait [Normal Insight/Judgement] : insight and judgment were intact [de-identified] : 2 cm nodule, soft left axilla [de-identified] : anxious

## 2020-11-09 NOTE — HEALTH RISK ASSESSMENT
[] : No [Yes] : Yes [No falls in past year] : Patient reported no falls in the past year [0] : 2) Feeling down, depressed, or hopeless: Not at all (0) [[id number]] : 0 [Good] : ~his/her~  mood as  good [Patient reported mammogram was normal] : Patient reported mammogram was normal [Patient reported colonoscopy was normal] : Patient reported colonoscopy was normal [Alone] : lives alone [Employed] : employed [Single] : single [# Of Children ___] : has [unfilled] children [Fully functional (bathing, dressing, toileting, transferring, walking, feeding)] : Fully functional (bathing, dressing, toileting, transferring, walking, feeding) [Fully functional (using the telephone, shopping, preparing meals, housekeeping, doing laundry, using] : Fully functional and needs no help or supervision to perform IADLs (using the telephone, shopping, preparing meals, housekeeping, doing laundry, using transportation, managing medications and managing finances) [Smoke Detector] : smoke detector [Seat Belt] :  uses seat belt [MammogramDate] : 7/2019 [PapSmearDate] : 7/2019 [ColonoscopyComments] : fit test 2018 [de-identified] :  [With Patient/Caregiver] : With Patient/Caregiver [AdvancecareDate] : 05/01/2019 [FreeTextEntry4] : patient does not have health care proxy\par will consider later on

## 2020-11-09 NOTE — ASSESSMENT
[FreeTextEntry1] : flu\par Patient was given a vaccine and was counseled regarding all side affects. Patient was advised to ice the area if necessary if it is tender or red. Patient was told to return to office if has any fever, nausea, vomiting or increased pain.\par \par hypertension\par The patient has a diagnosis of hypertension. The diagnosis was discussed with patient and need for medication compliance and possible side affects and risks of noncompliance. Patient was told to adhere to a low salt diet and try to incorporate exercise daily.\par reevaluated today, better controlled, doing good\par \par elevated calcium/PTH\par reviewed by with patient, PTH high as well\par must fu with endo\par \par THYROID DYSFUNCTION\par Patient had a diagnosis of thyroid disorder. Blood was drawn to assess levels of TSH and T4. Patient will continue on current dose of medications at this time unless instructed otherwise. Patient was advised to take thyroid medications on a empty stomach and to wait at least 45 minutes before eating for appropriate absorption.\par \par \par had fit test, does not want colonoscopy\par \par anxiety \par Discussed diagnosis of anxiety with the patient and potential outcomes/side affects of treatment versus non treatment. Medications were assessed and described at length. Side affects and black box warning were discussed.  Patient was advised to continue will all medications prescribed and the need for compliance was discussed and emphasized. Patient was advised to not stop medications without discussing with a health care provider first. Patient was advised to continue psychotherapy or seek therapy if not currently attending. Patient was educated on addictive potential of controlled substances and was counseled to use only as needed and sparingly. Patient verbalized understanding of all the above.\par doing much better\par \par reflux doing better\par \par

## 2020-12-21 ENCOUNTER — APPOINTMENT (OUTPATIENT)
Dept: ENDOCRINOLOGY | Facility: CLINIC | Age: 55
End: 2020-12-21
Payer: COMMERCIAL

## 2020-12-21 VITALS
WEIGHT: 162 LBS | SYSTOLIC BLOOD PRESSURE: 137 MMHG | BODY MASS INDEX: 25.43 KG/M2 | HEIGHT: 67 IN | TEMPERATURE: 97.5 F | HEART RATE: 79 BPM | DIASTOLIC BLOOD PRESSURE: 91 MMHG | OXYGEN SATURATION: 97 %

## 2020-12-21 DIAGNOSIS — Z82.49 FAMILY HISTORY OF ISCHEMIC HEART DISEASE AND OTHER DISEASES OF THE CIRCULATORY SYSTEM: ICD-10-CM

## 2020-12-21 DIAGNOSIS — Z83.49 FAMILY HISTORY OF OTHER ENDOCRINE, NUTRITIONAL AND METABOLIC DISEASES: ICD-10-CM

## 2020-12-21 PROCEDURE — 36415 COLL VENOUS BLD VENIPUNCTURE: CPT

## 2020-12-21 PROCEDURE — 99205 OFFICE O/P NEW HI 60 MIN: CPT | Mod: 25

## 2020-12-21 PROCEDURE — 99072 ADDL SUPL MATRL&STAF TM PHE: CPT

## 2020-12-21 NOTE — REVIEW OF SYSTEMS
[As Noted in HPI] : as noted in HPI [Negative] : Heme/Lymph [All other systems negative] : All other systems negative [Recent Weight Gain (___ Lbs)] : no recent weight gain [Recent Weight Loss (___ Lbs)] : no recent weight loss [FreeTextEntry4] : PND and sinus problems  [FreeTextEntry8] : no h/o kidney stones

## 2020-12-21 NOTE — HISTORY OF PRESENT ILLNESS
[FreeTextEntry1] : Patient was referred here for evaluation of hypercalcemia most likely due to hyperparathyroidism. She has no history of kidney stones and was told that her bone density was good.  She is not taking any calcium supplements but does take 5000 iu D3 daily. She did go through an early menopause age 46 most likely due to receiving chemotherapy for treatment of breast cancer. She's not on any continuous medication for this. She was started on hydrochlorothiazide for hypertension in April but her calcium was elevated prior to this. She also has a history of hypothyroidism currently on thyroid hormone replacement but believes she originally presented with an overactive thyroid. She did not receive radioactive iodine or thyroid surgery. She does have a history of a thyroid nodule on the sonogram. There is a family history of thyroid disease both her mom and sister and her mother recently had 3 parathyroid glands removed for management of hyperparathyroidism.

## 2020-12-21 NOTE — CONSULT LETTER
[Dear  ___] : Dear  [unfilled], [( Thank you for referring [unfilled] for consultation for _____ )] : Thank you for referring [unfilled] for consultation for [unfilled] [Please see my note below.] : Please see my note below. [Sincerely,] : Sincerely, [FreeTextEntry3] : Ghazala Segura MD\par

## 2020-12-21 NOTE — ASSESSMENT
[Levothyroxine] : The patient was instructed to take Levothyroxine on an empty stomach, separate from vitamins, and wait at least 30 minutes before eating [FreeTextEntry1] : Patient with history of hyperparathyroidism documented by labs showing an elevated calcium and a non suppressed PTH. We'll obtain records from her prior endocrinologist. She does not remember ever doing a 24-hour urine for calcium. To do this he would have to stop hydrochlorothiazide so we'll discuss with her PCP. Will lower Vit D 5000 to twice a week.  Laboratory tests were done today for calcium parathyroid vitamin D  and thyroid hormone levels. She has been hypothyroid for over 5 years. We'll also try to obtain a copy of her bone density report.Given her age and high risk for bone loss  due to  an early menopause and having hyperparathyroidism surgery should be considered. She will followup in 2 months.Of note her mom apparently had parathyroid hyperplasia which can be seen in MEN syndromes.

## 2020-12-21 NOTE — PAST MEDICAL HISTORY
[Menstruating] : The patient is menstruating [Menarche Age ____] : age at menarche was [unfilled] [Menopause Age____] : age at menopause was [unfilled] [Regular Cycle Intervals] : have been regular [Total Preg ___] : G[unfilled]

## 2020-12-21 NOTE — REASON FOR VISIT
[Consultation] : a consultation visit [Hypercalcemia] : hypercalcemia [Hypothyroidism] : hypothyroidism [FreeTextEntry2] : Dr. Pollock

## 2021-01-07 ENCOUNTER — RX RENEWAL (OUTPATIENT)
Age: 56
End: 2021-01-07

## 2021-01-14 LAB
24R-OH-CALCIDIOL SERPL-MCNC: 75 PG/ML
25(OH)D3 SERPL-MCNC: 44.3 NG/ML
ALBUMIN SERPL ELPH-MCNC: 4.8 G/DL
ALP BLD-CCNC: 74 U/L
ALT SERPL-CCNC: 18 U/L
ANION GAP SERPL CALC-SCNC: 12 MMOL/L
AST SERPL-CCNC: 14 U/L
BILIRUB SERPL-MCNC: 1 MG/DL
BUN SERPL-MCNC: 10 MG/DL
CA-I SERPL-SCNC: 1.11 MMOL/L
CALCIUM SERPL-MCNC: 11.4 MG/DL
CALCIUM SERPL-MCNC: 11.4 MG/DL
CHLORIDE SERPL-SCNC: 100 MMOL/L
CO2 SERPL-SCNC: 27 MMOL/L
CREAT SERPL-MCNC: 0.76 MG/DL
FSH SERPL-MCNC: 92.9 IU/L
GLUCOSE SERPL-MCNC: 99 MG/DL
LH SERPL-ACNC: 42.3 IU/L
MAGNESIUM SERPL-MCNC: 1.9 MG/DL
PARATHYROID HORMONE INTACT: 61 PG/ML
PHOSPHATE SERPL-MCNC: 2.6 MG/DL
POTASSIUM SERPL-SCNC: 4.4 MMOL/L
PROLACTIN SERPL-MCNC: 8.9 NG/ML
PROT SERPL-MCNC: 7.7 G/DL
SODIUM SERPL-SCNC: 139 MMOL/L
T3 SERPL-MCNC: 94 NG/DL
T4 FREE SERPL-MCNC: 1.7 NG/DL
T4 SERPL-MCNC: 8.7 UG/DL
THYROGLOB AB SERPL-ACNC: <20 IU/ML
THYROPEROXIDASE AB SERPL IA-ACNC: 1090 IU/ML
TSH RECEPTOR AB: <1.1 IU/L
TSH SERPL-ACNC: 0.85 UIU/ML
TSI ACT/NOR SER: 0.74 IU/L

## 2021-02-10 ENCOUNTER — APPOINTMENT (OUTPATIENT)
Dept: FAMILY MEDICINE | Facility: CLINIC | Age: 56
End: 2021-02-10
Payer: COMMERCIAL

## 2021-02-10 VITALS
BODY MASS INDEX: 25.43 KG/M2 | SYSTOLIC BLOOD PRESSURE: 135 MMHG | HEART RATE: 84 BPM | OXYGEN SATURATION: 98 % | DIASTOLIC BLOOD PRESSURE: 90 MMHG | WEIGHT: 162 LBS | HEIGHT: 67 IN

## 2021-02-10 LAB
25(OH)D3 SERPL-MCNC: 43.6 NG/ML
ALBUMIN SERPL ELPH-MCNC: 4.9 G/DL
ALP BLD-CCNC: 84 U/L
ALT SERPL-CCNC: 27 U/L
ANION GAP SERPL CALC-SCNC: 14 MMOL/L
APPEARANCE: CLEAR
AST SERPL-CCNC: 22 U/L
BASOPHILS # BLD AUTO: 0.06 K/UL
BASOPHILS NFR BLD AUTO: 0.8 %
BILIRUB SERPL-MCNC: 1.1 MG/DL
BILIRUBIN URINE: NEGATIVE
BLOOD URINE: NEGATIVE
BUN SERPL-MCNC: 9 MG/DL
CALCIUM SERPL-MCNC: 11.2 MG/DL
CHLORIDE SERPL-SCNC: 104 MMOL/L
CHOLEST SERPL-MCNC: 191 MG/DL
CO2 SERPL-SCNC: 25 MMOL/L
COLOR: COLORLESS
CREAT SERPL-MCNC: 0.78 MG/DL
EOSINOPHIL # BLD AUTO: 0.12 K/UL
EOSINOPHIL NFR BLD AUTO: 1.7 %
ESTIMATED AVERAGE GLUCOSE: 108 MG/DL
FOLATE SERPL-MCNC: >20 NG/ML
GLUCOSE QUALITATIVE U: NEGATIVE
GLUCOSE SERPL-MCNC: 95 MG/DL
HBA1C MFR BLD HPLC: 5.4 %
HCT VFR BLD CALC: 40.6 %
HDLC SERPL-MCNC: 59 MG/DL
HGB BLD-MCNC: 13.2 G/DL
IMM GRANULOCYTES NFR BLD AUTO: 0.3 %
KETONES URINE: NEGATIVE
LDLC SERPL CALC-MCNC: 119 MG/DL
LEUKOCYTE ESTERASE URINE: NEGATIVE
LYMPHOCYTES # BLD AUTO: 2.54 K/UL
LYMPHOCYTES NFR BLD AUTO: 35 %
MAN DIFF?: NORMAL
MCHC RBC-ENTMCNC: 30.9 PG
MCHC RBC-ENTMCNC: 32.5 GM/DL
MCV RBC AUTO: 95.1 FL
MONOCYTES # BLD AUTO: 0.5 K/UL
MONOCYTES NFR BLD AUTO: 6.9 %
NEUTROPHILS # BLD AUTO: 4.02 K/UL
NEUTROPHILS NFR BLD AUTO: 55.3 %
NITRITE URINE: NEGATIVE
NONHDLC SERPL-MCNC: 132 MG/DL
PH URINE: 7
PLATELET # BLD AUTO: 260 K/UL
POTASSIUM SERPL-SCNC: 4.3 MMOL/L
PROT SERPL-MCNC: 7.5 G/DL
PROTEIN URINE: NEGATIVE
RBC # BLD: 4.27 M/UL
RBC # FLD: 11.9 %
SODIUM SERPL-SCNC: 143 MMOL/L
SPECIFIC GRAVITY URINE: 1
TRIGL SERPL-MCNC: 65 MG/DL
TSH SERPL-ACNC: 0.38 UIU/ML
UROBILINOGEN URINE: NORMAL
VIT B12 SERPL-MCNC: 378 PG/ML
WBC # FLD AUTO: 7.26 K/UL

## 2021-02-10 PROCEDURE — 99072 ADDL SUPL MATRL&STAF TM PHE: CPT

## 2021-02-10 PROCEDURE — 36415 COLL VENOUS BLD VENIPUNCTURE: CPT

## 2021-02-10 PROCEDURE — 99396 PREV VISIT EST AGE 40-64: CPT | Mod: 25

## 2021-02-10 RX ORDER — LOSARTAN POTASSIUM AND HYDROCHLOROTHIAZIDE 12.5; 1 MG/1; MG/1
100-12.5 TABLET ORAL DAILY
Qty: 90 | Refills: 1 | Status: DISCONTINUED | COMMUNITY
Start: 2020-04-30 | End: 2021-02-10

## 2021-02-10 RX ORDER — BROMPHENIRAMINE MALEATE, PSEUDOEPHEDRINE HYDROCHLORIDE, 2; 30; 10 MG/5ML; MG/5ML; MG/5ML
30-2-10 SYRUP ORAL
Qty: 210 | Refills: 0 | Status: DISCONTINUED | COMMUNITY
Start: 2020-12-08 | End: 2021-02-10

## 2021-02-10 NOTE — HEALTH RISK ASSESSMENT
[Good] : ~his/her~  mood as  good [Yes] : Yes [No falls in past year] : Patient reported no falls in the past year [0] : 2) Feeling down, depressed, or hopeless: Not at all (0) [Patient reported mammogram was normal] : Patient reported mammogram was normal [Patient reported colonoscopy was normal] : Patient reported colonoscopy was normal [Alone] : lives alone [Employed] : employed [Single] : single [# Of Children ___] : has [unfilled] children [Fully functional (bathing, dressing, toileting, transferring, walking, feeding)] : Fully functional (bathing, dressing, toileting, transferring, walking, feeding) [Fully functional (using the telephone, shopping, preparing meals, housekeeping, doing laundry, using] : Fully functional and needs no help or supervision to perform IADLs (using the telephone, shopping, preparing meals, housekeeping, doing laundry, using transportation, managing medications and managing finances) [Smoke Detector] : smoke detector [Seat Belt] :  uses seat belt [With Patient/Caregiver] : With Patient/Caregiver [] : No [[id number]] : 0 [MammogramDate] : 7/2020 [MammogramComments] : breast cancer - left  [PapSmearDate] : 7/2020 [ColonoscopyComments] : 2018 [de-identified] :  [AdvancecareDate] : 05/01/2019 [FreeTextEntry4] : patient does not have health care proxy\par will consider later on

## 2021-02-10 NOTE — PHYSICAL EXAM
[Well Nourished] : well nourished [Normal Oropharynx] : the oropharynx was normal [Clear to Auscultation] : lungs were clear to auscultation bilaterally [Regular Rhythm] : with a regular rhythm [Soft] : abdomen soft [Non Tender] : non-tender [Normal Gait] : normal gait [Normal Insight/Judgement] : insight and judgment were intact [de-identified] : anxious

## 2021-02-10 NOTE — ASSESSMENT
[FreeTextEntry1] : \par \par hypertension\par The patient has a diagnosis of hypertension. The diagnosis was discussed with patient and need for medication compliance and possible side affects and risks of noncompliance. Patient was told to adhere to a low salt diet and try to incorporate exercise daily.\par reevaluated today, was stopped on hctz by endo, start amlodipine and reassess\par \par elevated calcium/PTH\par reviewed by with patient, PTH high as well\par must fu with endo, stopped hctz to see if will help calcium\par \par THYROID DYSFUNCTION\par Patient had a diagnosis of thyroid disorder. Blood was drawn to assess levels of TSH and T4. Patient will continue on current dose of medications at this time unless instructed otherwise. Patient was advised to take thyroid medications on a empty stomach and to wait at least 45 minutes before eating for appropriate absorption.\par \par \par had fit test, does not want colonoscopy\par \par anxiety \par Discussed diagnosis of anxiety with the patient and potential outcomes/side affects of treatment versus non treatment. Medications were assessed and described at length. Side affects and black box warning were discussed.  Patient was advised to continue will all medications prescribed and the need for compliance was discussed and emphasized. Patient was advised to not stop medications without discussing with a health care provider first. Patient was advised to continue psychotherapy or seek therapy if not currently attending. Patient was educated on addictive potential of controlled substances and was counseled to use only as needed and sparingly. Patient verbalized understanding of all the above.\par doing much better\par \par reflux doing better\par \par

## 2021-02-10 NOTE — HISTORY OF PRESENT ILLNESS
[FreeTextEntry8] : 55 year old female  here for annual well visit. Patient's blood work was drawn and medications reviewed. Patient's past medical history was reviewed, allergies verified and problems were identified and assessed. Patients medications were reviewed. Patient is feeling well with no new or active complaints at this time.\par \par \par Patient  patient using xanax as needed.

## 2021-02-18 ENCOUNTER — APPOINTMENT (OUTPATIENT)
Dept: ENDOCRINOLOGY | Facility: CLINIC | Age: 56
End: 2021-02-18
Payer: COMMERCIAL

## 2021-02-18 PROCEDURE — 99214 OFFICE O/P EST MOD 30 MIN: CPT | Mod: 95

## 2021-02-18 NOTE — HISTORY OF PRESENT ILLNESS
[Home] : at home, [unfilled] , at the time of the visit. [Medical Office: ([facility])___] : at the medical office located in  [Verbal consent obtained from patient] : the patient, [unfilled] [FreeTextEntry1] : Patient was referred here for evaluation of hypercalcemia most likely due to hyperparathyroidism. She has no history of kidney stones and was told that her bone density was good.  She is not taking any calcium supplements but does take 5000 iu D3 daily. She did go through an early menopause age 46 most likely due to receiving chemotherapy for treatment of breast cancer. She's not on any continuous medication for this. She was started on hydrochlorothiazide for hypertension in April but her calcium was elevated prior to this. She also has a history of hypothyroidism currently on thyroid hormone replacement but believes she originally presented with an overactive thyroid. She did not receive radioactive iodine or thyroid surgery. She does have a history of a thyroid nodule on the sonogram. There is a family history of thyroid disease both her mom and sister and her mother recently had 3 parathyroid glands removed for management of hyperparathyroidism.

## 2021-02-18 NOTE — PHYSICAL EXAM
[Alert] : alert [No Acute Distress] : no acute distress [Normal Voice/Communication] : normal voice communication [Normal Sclera/Conjunctiva] : normal sclera/conjunctiva [Normal Outer Ear/Nose] : the ears and nose were normal in appearance [No Neck Mass] : no neck mass was observed [No Respiratory Distress] : no respiratory distress [Oriented x3] : oriented to person, place, and time [Normal Insight/Judgement] : insight and judgment were intact [de-identified] : Limited exam due to Telehealth visit secondary to Covid pandemic and snow

## 2021-02-18 NOTE — ASSESSMENT
[FreeTextEntry1] : Patient with history of hyperparathyroidism documented by labs showing an elevated calcium and a non suppressed PTH. We'll obtain records from her prior endocrinologist. She will now do a 24-hour urine for calcium. She appears to be off  hydrochlorothiazide. She did lower Vit D 5000 to twice a week.  BD showed osteopenia. To do neck sonogram \par She has been hypothyroid for over 5 years. TFT stable. \par .Given her age and high risk for bone loss  due to  an early menopause and having hyperparathyroidism surgery should be considered. Of note her mom apparently had parathyroid hyperplasia which can be seen in MEN syndromes. \par f/u one mos [Levothyroxine] : The patient was instructed to take Levothyroxine on an empty stomach, separate from vitamins, and wait at least 30 minutes before eating

## 2021-02-18 NOTE — REASON FOR VISIT
[Follow - Up] : a follow-up visit [Hypercalcemia] : hypercalcemia [Hypothyroidism] : hypothyroidism [Hyperparathyroidism] : hyperparathyroidism

## 2021-02-28 ENCOUNTER — TRANSCRIPTION ENCOUNTER (OUTPATIENT)
Age: 56
End: 2021-02-28

## 2021-03-22 ENCOUNTER — APPOINTMENT (OUTPATIENT)
Dept: ENDOCRINOLOGY | Facility: CLINIC | Age: 56
End: 2021-03-22
Payer: COMMERCIAL

## 2021-03-22 LAB
CAU: 23 MG/DL
CREAT 24H UR-MCNC: 0.6 G/24 H
CREAT 24H UR-MCNC: 0.6 G/24 H
CREAT ?TM UR-MCNC: 93 MG/DL
CREAT ?TM UR-MCNC: 93 MG/DL
PROT ?TM UR-MCNC: 24 HR
PROT ?TM UR-MCNC: 24 HR
SPECIMEN VOL 24H UR: 155 MG/24 H
SPECIMEN VOL 24H UR: 675 ML
SPECIMEN VOL 24H UR: 675 ML

## 2021-03-22 PROCEDURE — 99214 OFFICE O/P EST MOD 30 MIN: CPT | Mod: 95

## 2021-03-22 NOTE — PHYSICAL EXAM
[Alert] : alert [No Acute Distress] : no acute distress [Normal Voice/Communication] : normal voice communication [Normal Sclera/Conjunctiva] : normal sclera/conjunctiva [Normal Outer Ear/Nose] : the ears and nose were normal in appearance [No Neck Mass] : no neck mass was observed [No Respiratory Distress] : no respiratory distress [Oriented x3] : oriented to person, place, and time [Normal Insight/Judgement] : insight and judgment were intact [de-identified] : Limited exam due to Telehealth visit secondary to Covid pandemic and snow

## 2021-03-22 NOTE — REASON FOR VISIT
[Home] : at home, [unfilled] , at the time of the visit. [Medical Office: ([facility])___] : at the medical office located in  [Verbal consent obtained from patient] : the patient, [unfilled]

## 2021-03-22 NOTE — ASSESSMENT
[FreeTextEntry1] : Patient with history of hyperparathyroidism documented by labs showing an elevated calcium and a non suppressed PTH. We'll obtain records from her prior endocrinologist. She did a 24-hour urine for calcium mildly elevated c/s PHPT. . She  has been off  hydrochlorothiazide. She did lower Vit D 5000 to twice a week.  BD showed osteopenia.  neck sonogram shows small right side thyroid nodule and area on left posterior c/w parathyroid adenoma. Will consult with surgeon. \par She has been hypothyroid for over 5 years. TFT stable. \par .Given her age and high risk for bone loss  due to  an early menopause and having hyperparathyroidism surgery should be considered. Of note her mom apparently had parathyroid hyperplasia which can be seen in MEN syndromes. to do 24 hour urine cats and mets. \par f/u 3 months  [Levothyroxine] : The patient was instructed to take Levothyroxine on an empty stomach, separate from vitamins, and wait at least 30 minutes before eating

## 2021-03-22 NOTE — HISTORY OF PRESENT ILLNESS
[FreeTextEntry1] : Patient was referred here for evaluation of hypercalcemia most likely due to hyperparathyroidism. She has no history of kidney stones and was told that her bone density was good.  She is not taking any calcium supplements but does take 5000 iu D3 daily. She did go through an early menopause age 46 most likely due to receiving chemotherapy for treatment of breast cancer. She's not on any continuous medication for this. She was started on hydrochlorothiazide for hypertension in April but her calcium was elevated prior to this. She also has a history of hypothyroidism currently on thyroid hormone replacement but believes she originally presented with an overactive thyroid. She did not receive radioactive iodine or thyroid surgery. She does have a history of a thyroid nodule on the sonogram. There is a family history of thyroid disease both her mom and sister and her mother recently had 3 parathyroid glands removed for management of hyperparathyroidism. Thyroid sonogram recently done and 24 hour urine for calcium.

## 2021-04-07 ENCOUNTER — RX RENEWAL (OUTPATIENT)
Age: 56
End: 2021-04-07

## 2021-04-12 ENCOUNTER — APPOINTMENT (OUTPATIENT)
Dept: FAMILY MEDICINE | Facility: CLINIC | Age: 56
End: 2021-04-12
Payer: COMMERCIAL

## 2021-04-12 ENCOUNTER — NON-APPOINTMENT (OUTPATIENT)
Age: 56
End: 2021-04-12

## 2021-04-12 ENCOUNTER — APPOINTMENT (OUTPATIENT)
Dept: FAMILY MEDICINE | Facility: CLINIC | Age: 56
End: 2021-04-12

## 2021-04-12 VITALS
OXYGEN SATURATION: 98 % | HEIGHT: 67 IN | TEMPERATURE: 96.5 F | BODY MASS INDEX: 25.58 KG/M2 | SYSTOLIC BLOOD PRESSURE: 125 MMHG | HEART RATE: 112 BPM | WEIGHT: 163 LBS | DIASTOLIC BLOOD PRESSURE: 75 MMHG

## 2021-04-12 DIAGNOSIS — Z76.0 ENCOUNTER FOR ISSUE OF REPEAT PRESCRIPTION: ICD-10-CM

## 2021-04-12 PROCEDURE — 99072 ADDL SUPL MATRL&STAF TM PHE: CPT

## 2021-04-12 PROCEDURE — 99214 OFFICE O/P EST MOD 30 MIN: CPT

## 2021-04-12 NOTE — HEALTH RISK ASSESSMENT
[] : No [Yes] : Yes [No falls in past year] : Patient reported no falls in the past year [0] : 2) Feeling down, depressed, or hopeless: Not at all (0) [NAN4Ahuah] : 0 [Good] : ~his/her~  mood as  good [Patient reported mammogram was normal] : Patient reported mammogram was normal [Patient reported colonoscopy was normal] : Patient reported colonoscopy was normal [Alone] : lives alone [Employed] : employed [Single] : single [# Of Children ___] : has [unfilled] children [Fully functional (bathing, dressing, toileting, transferring, walking, feeding)] : Fully functional (bathing, dressing, toileting, transferring, walking, feeding) [Fully functional (using the telephone, shopping, preparing meals, housekeeping, doing laundry, using] : Fully functional and needs no help or supervision to perform IADLs (using the telephone, shopping, preparing meals, housekeeping, doing laundry, using transportation, managing medications and managing finances) [Smoke Detector] : smoke detector [Seat Belt] :  uses seat belt [MammogramDate] : 7/2020 [MammogramComments] : breast cancer - left  [ColonoscopyComments] : 2018 [PapSmearDate] : 7/2020 [de-identified] :  [With Patient/Caregiver] : With Patient/Caregiver [AdvancecareDate] : 05/01/2019 [FreeTextEntry4] : patient does not have health care proxy\par will consider later on

## 2021-04-12 NOTE — PHYSICAL EXAM
[Well Nourished] : well nourished [Normal Oropharynx] : the oropharynx was normal [Clear to Auscultation] : lungs were clear to auscultation bilaterally [Regular Rhythm] : with a regular rhythm [Soft] : abdomen soft [Non Tender] : non-tender [Normal Gait] : normal gait [Normal Insight/Judgement] : insight and judgment were intact [de-identified] : anxious

## 2021-04-22 ENCOUNTER — RX RENEWAL (OUTPATIENT)
Age: 56
End: 2021-04-22

## 2021-04-25 PROBLEM — Z76.0 MEDICATION REFILL: Status: ACTIVE | Noted: 2020-04-30

## 2021-05-21 NOTE — ASSESSMENT
[FreeTextEntry1] : \par \par hypertension\par The patient has a diagnosis of hypertension. The diagnosis was discussed with patient and need for medication compliance and possible side affects and risks of noncompliance. Patient was told to adhere to a low salt diet and try to incorporate exercise daily.\par reevaluated today, was stopped on hctz by endo, started on amlodipine, doing well\par elevated calcium/PTH\par reviewed by with patient, PTH high as well\par must fu with endo, stopped hctz to see if will help calcium\par \par THYROID DYSFUNCTION\par Patient had a diagnosis of thyroid disorder. Blood was drawn to assess levels of TSH and T4. Patient will continue on current dose of medications at this time unless instructed otherwise. Patient was advised to take thyroid medications on a empty stomach and to wait at least 45 minutes before eating for appropriate absorption.\par \par \par had fit test, does not want colonoscopy\par \par anxiety \par Discussed diagnosis of anxiety with the patient and potential outcomes/side affects of treatment versus non treatment. Medications were assessed and described at length. Side affects and black box warning were discussed.  Patient was advised to continue will all medications prescribed and the need for compliance was discussed and emphasized. Patient was advised to not stop medications without discussing with a health care provider first. Patient was advised to continue psychotherapy or seek therapy if not currently attending. Patient was educated on addictive potential of controlled substances and was counseled to use only as needed and sparingly. Patient verbalized understanding of all the above.\par doing much better\par \par reflux doing better\par \par  Discontinue Regimen: doxycycline monohydrate 100 mg tablet \\nSig: Take one tab PO BID for 1 week. Detail Level: Zone Render In Strict Bullet Format?: No

## 2021-05-25 ENCOUNTER — APPOINTMENT (OUTPATIENT)
Dept: ENDOCRINOLOGY | Facility: CLINIC | Age: 56
End: 2021-05-25
Payer: COMMERCIAL

## 2021-05-25 VITALS
DIASTOLIC BLOOD PRESSURE: 71 MMHG | BODY MASS INDEX: 26.21 KG/M2 | WEIGHT: 167 LBS | HEART RATE: 83 BPM | SYSTOLIC BLOOD PRESSURE: 137 MMHG | HEIGHT: 67 IN

## 2021-05-25 PROCEDURE — 99204 OFFICE O/P NEW MOD 45 MIN: CPT

## 2021-05-25 PROCEDURE — 99072 ADDL SUPL MATRL&STAF TM PHE: CPT

## 2021-05-28 RX ORDER — CHLORHEXIDINE GLUCONATE, 0.12% ORAL RINSE 1.2 MG/ML
0.12 SOLUTION DENTAL
Qty: 473 | Refills: 0 | Status: ACTIVE | COMMUNITY
Start: 2021-03-24

## 2021-05-28 NOTE — HISTORY OF PRESENT ILLNESS
[de-identified] : history of hypercalcemia.  history of back pain. denies dysphagia, hoarseness, SOB, RT exposure or any other symptoms related to hypercalcemia. mother recently underwent subtotal parathyroidectomy, but none of patients siblings have been treated for hyperparathyroidism.  history of hypothyroidism treated with Synthroid 75 mcg daily.  calcium 11.2, PTH 61, vitamin D 43.6, 24 hour urinary calcium 155.  TSH 0.38. urinary metanephrines pending.  sonogram shows 6 mm right thyroid nodule, possible left parathyroid, lymph node or exophytic thyroid nodule.  bone density shows osteopenia.

## 2021-05-28 NOTE — CONSULT LETTER
[Dear  ___] : Dear  [unfilled], [Consult Letter:] : I had the pleasure of evaluating your patient, [unfilled]. [Please see my note below.] : Please see my note below. [Consult Closing:] : Thank you very much for allowing me to participate in the care of this patient.  If you have any questions, please do not hesitate to contact me. [Sincerely,] : Sincerely, [FreeTextEntry2] : Dr. Ghazala Segura, Dr. Marlene Pollock [FreeTextEntry3] : Ayan Mae MD, FACS\par System Director, Endocrine Surgery\par [facility]\par Assistant Clinical Professor of Surgery\par [facility] [employer] [facility]  [DrJohn  ___] : Dr. ENG

## 2021-05-28 NOTE — ASSESSMENT
[FreeTextEntry1] : lengthy discussion regarding options for management. in view of labs and symptoms have recommended minimally invasive parathyroidectomy with PTH assay.  will require preop 4D CT.  risks, benefits and alternatives discussed at length.  I have discussed with the patient the anatomy of the area, the pathophysiology of the disease process and the rationale for surgery.  The attendant risks, possible complications and expected postoperative course have been discussed in detail.  I have given the patient the opportunity to ask questions, and all questions have been answered to the patient's satisfaction.  wishes to await results of CT prior to scheduling surgery.  to call next week for results. \par

## 2021-05-28 NOTE — PHYSICAL EXAM
[de-identified] : no palpable thyroid nodules [Laryngoscopy Performed] : laryngoscopy was performed, see procedure section for findings [Midline] : located in midline position [Normal] : orientation to person, place, and time: normal [de-identified] : indirect  laryngoscopy shows normal vocal cord mobility bilaterally with no lesions noted

## 2021-06-08 ENCOUNTER — APPOINTMENT (OUTPATIENT)
Dept: CT IMAGING | Facility: CLINIC | Age: 56
End: 2021-06-08
Payer: COMMERCIAL

## 2021-06-08 ENCOUNTER — OUTPATIENT (OUTPATIENT)
Dept: OUTPATIENT SERVICES | Facility: HOSPITAL | Age: 56
LOS: 1 days | End: 2021-06-08
Payer: COMMERCIAL

## 2021-06-08 DIAGNOSIS — Z00.8 ENCOUNTER FOR OTHER GENERAL EXAMINATION: ICD-10-CM

## 2021-06-08 DIAGNOSIS — Z98.890 OTHER SPECIFIED POSTPROCEDURAL STATES: Chronic | ICD-10-CM

## 2021-06-08 PROCEDURE — 70492 CT SFT TSUE NCK W/O & W/DYE: CPT

## 2021-06-08 PROCEDURE — 70491 CT SOFT TISSUE NECK W/DYE: CPT | Mod: 26

## 2021-06-14 ENCOUNTER — NON-APPOINTMENT (OUTPATIENT)
Age: 56
End: 2021-06-14

## 2021-06-28 ENCOUNTER — APPOINTMENT (OUTPATIENT)
Dept: ENDOCRINOLOGY | Facility: CLINIC | Age: 56
End: 2021-06-28
Payer: COMMERCIAL

## 2021-06-28 PROCEDURE — 99442: CPT

## 2021-07-23 ENCOUNTER — APPOINTMENT (OUTPATIENT)
Dept: FAMILY MEDICINE | Facility: CLINIC | Age: 56
End: 2021-07-23

## 2021-07-23 VITALS
SYSTOLIC BLOOD PRESSURE: 130 MMHG | TEMPERATURE: 97.2 F | HEIGHT: 67 IN | HEART RATE: 70 BPM | WEIGHT: 167 LBS | DIASTOLIC BLOOD PRESSURE: 80 MMHG | OXYGEN SATURATION: 98 % | BODY MASS INDEX: 26.21 KG/M2

## 2021-07-23 RX ORDER — AMOXICILLIN 500 MG/1
500 CAPSULE ORAL
Qty: 21 | Refills: 0 | Status: DISCONTINUED | COMMUNITY
Start: 2021-03-24 | End: 2021-07-23

## 2021-08-02 ENCOUNTER — RX RENEWAL (OUTPATIENT)
Age: 56
End: 2021-08-02

## 2021-08-04 ENCOUNTER — OUTPATIENT (OUTPATIENT)
Dept: OUTPATIENT SERVICES | Facility: HOSPITAL | Age: 56
LOS: 1 days | End: 2021-08-04
Payer: COMMERCIAL

## 2021-08-04 VITALS
SYSTOLIC BLOOD PRESSURE: 140 MMHG | HEIGHT: 67.5 IN | RESPIRATION RATE: 16 BRPM | TEMPERATURE: 98 F | DIASTOLIC BLOOD PRESSURE: 84 MMHG | OXYGEN SATURATION: 97 % | WEIGHT: 167.99 LBS | HEART RATE: 62 BPM

## 2021-08-04 DIAGNOSIS — E21.0 PRIMARY HYPERPARATHYROIDISM: ICD-10-CM

## 2021-08-04 DIAGNOSIS — Z98.890 OTHER SPECIFIED POSTPROCEDURAL STATES: Chronic | ICD-10-CM

## 2021-08-04 DIAGNOSIS — E21.3 HYPERPARATHYROIDISM, UNSPECIFIED: ICD-10-CM

## 2021-08-04 LAB
ANION GAP SERPL CALC-SCNC: 12 MMOL/L — SIGNIFICANT CHANGE UP (ref 7–14)
BUN SERPL-MCNC: 12 MG/DL — SIGNIFICANT CHANGE UP (ref 7–23)
CALCIUM SERPL-MCNC: 11.3 MG/DL — HIGH (ref 8.4–10.5)
CHLORIDE SERPL-SCNC: 102 MMOL/L — SIGNIFICANT CHANGE UP (ref 98–107)
CO2 SERPL-SCNC: 25 MMOL/L — SIGNIFICANT CHANGE UP (ref 22–31)
CREAT SERPL-MCNC: 0.69 MG/DL — SIGNIFICANT CHANGE UP (ref 0.5–1.3)
GLUCOSE SERPL-MCNC: 91 MG/DL — SIGNIFICANT CHANGE UP (ref 70–99)
HCG UR QL: NEGATIVE — SIGNIFICANT CHANGE UP
HCT VFR BLD CALC: 40.6 % — SIGNIFICANT CHANGE UP (ref 34.5–45)
HGB BLD-MCNC: 13.4 G/DL — SIGNIFICANT CHANGE UP (ref 11.5–15.5)
MCHC RBC-ENTMCNC: 30.5 PG — SIGNIFICANT CHANGE UP (ref 27–34)
MCHC RBC-ENTMCNC: 33 GM/DL — SIGNIFICANT CHANGE UP (ref 32–36)
MCV RBC AUTO: 92.5 FL — SIGNIFICANT CHANGE UP (ref 80–100)
NRBC # BLD: 0 /100 WBCS — SIGNIFICANT CHANGE UP
NRBC # FLD: 0 K/UL — SIGNIFICANT CHANGE UP
PLATELET # BLD AUTO: 260 K/UL — SIGNIFICANT CHANGE UP (ref 150–400)
POTASSIUM SERPL-MCNC: 4.7 MMOL/L — SIGNIFICANT CHANGE UP (ref 3.5–5.3)
POTASSIUM SERPL-SCNC: 4.7 MMOL/L — SIGNIFICANT CHANGE UP (ref 3.5–5.3)
RBC # BLD: 4.39 M/UL — SIGNIFICANT CHANGE UP (ref 3.8–5.2)
RBC # FLD: 11.9 % — SIGNIFICANT CHANGE UP (ref 10.3–14.5)
SODIUM SERPL-SCNC: 139 MMOL/L — SIGNIFICANT CHANGE UP (ref 135–145)
WBC # BLD: 8.18 K/UL — SIGNIFICANT CHANGE UP (ref 3.8–10.5)
WBC # FLD AUTO: 8.18 K/UL — SIGNIFICANT CHANGE UP (ref 3.8–10.5)

## 2021-08-04 PROCEDURE — 93010 ELECTROCARDIOGRAM REPORT: CPT

## 2021-08-04 NOTE — H&P PST ADULT - NSICDXPASTSURGICALHX_GEN_ALL_CORE_FT
PAST SURGICAL HISTORY:  History of back surgery lumbar fusion - L2-3 - hardware     PAST SURGICAL HISTORY:  H/O lumpectomy 2013 - left breast    History of back surgery lumbar fusion - L2-3 - hardware

## 2021-08-04 NOTE — H&P PST ADULT - HISTORY OF PRESENT ILLNESS
Pt is a 56 yr old female scheduled for Parathyroidectomy with PTH Assay with Dr Mae tentatively 8/20/21 - pt hx lumbar fusion 2015 with plate/screws, and left lumpectomy 2013 for breast ca - Chemo / RT - pt now to have surgery for elevated Ca levels that have been followed for 1 year. Pt also hx HTN, Hypothyroid   Pt denies COVID   Pt had Moderna vaccine 2/21 and 3/21

## 2021-08-04 NOTE — H&P PST ADULT - NSICDXPASTMEDICALHX_GEN_ALL_CORE_FT
PAST MEDICAL HISTORY:  Breast CA Left breast lumpectomy - 2013 - Chemo /RT    No pertinent past medical history      PAST MEDICAL HISTORY:  Breast CA Left breast lumpectomy - 2013 - Chemo /RT    HTN (hypertension)     Hyperparathyroidism     Hypothyroid     Lumbar disc disease

## 2021-08-04 NOTE — H&P PST ADULT - NSICDXPROBLEM_GEN_ALL_CORE_FT
PROBLEM DIAGNOSES  Problem: Hyperparathyroidism  Assessment and Plan: Pt scheduled for surgery and preop instructions including instructions for taking Famotidine and for Chlorhexidine use in showering on the day of surgery, given verbally and with use of  written materials, and patient confirming understanding of such instructions using  teach back   method.  Pt to see PCP for MC as per surgeon   Pt to take Levothyroxine , Amlodipine and Losartan am DOS

## 2021-08-04 NOTE — H&P PST ADULT - MUSCULOSKELETAL COMMENTS
Pt hx lumbar fusion with hardware 2015 - pt now c/o of occasional lower back pain and radiation to both legs

## 2021-08-05 PROBLEM — M51.9 UNSPECIFIED THORACIC, THORACOLUMBAR AND LUMBOSACRAL INTERVERTEBRAL DISC DISORDER: Chronic | Status: ACTIVE | Noted: 2021-08-04

## 2021-08-05 PROBLEM — E21.3 HYPERPARATHYROIDISM, UNSPECIFIED: Chronic | Status: ACTIVE | Noted: 2021-08-04

## 2021-08-05 PROBLEM — E03.9 HYPOTHYROIDISM, UNSPECIFIED: Chronic | Status: ACTIVE | Noted: 2021-08-04

## 2021-08-05 PROBLEM — I10 ESSENTIAL (PRIMARY) HYPERTENSION: Chronic | Status: ACTIVE | Noted: 2021-08-04

## 2021-08-05 PROBLEM — C50.919 MALIGNANT NEOPLASM OF UNSPECIFIED SITE OF UNSPECIFIED FEMALE BREAST: Chronic | Status: ACTIVE | Noted: 2021-08-04

## 2021-08-12 ENCOUNTER — APPOINTMENT (OUTPATIENT)
Dept: FAMILY MEDICINE | Facility: CLINIC | Age: 56
End: 2021-08-12
Payer: COMMERCIAL

## 2021-08-12 VITALS
OXYGEN SATURATION: 97 % | DIASTOLIC BLOOD PRESSURE: 74 MMHG | SYSTOLIC BLOOD PRESSURE: 118 MMHG | HEART RATE: 96 BPM | BODY MASS INDEX: 26.21 KG/M2 | TEMPERATURE: 96.5 F | HEIGHT: 67 IN | WEIGHT: 167 LBS

## 2021-08-12 DIAGNOSIS — Z01.818 ENCOUNTER FOR OTHER PREPROCEDURAL EXAMINATION: ICD-10-CM

## 2021-08-12 PROCEDURE — 99214 OFFICE O/P EST MOD 30 MIN: CPT

## 2021-08-13 DIAGNOSIS — Z01.818 ENCOUNTER FOR OTHER PREPROCEDURAL EXAMINATION: ICD-10-CM

## 2021-08-17 ENCOUNTER — APPOINTMENT (OUTPATIENT)
Dept: DISASTER EMERGENCY | Facility: CLINIC | Age: 56
End: 2021-08-17

## 2021-08-19 ENCOUNTER — TRANSCRIPTION ENCOUNTER (OUTPATIENT)
Age: 56
End: 2021-08-19

## 2021-08-19 NOTE — ASU PATIENT PROFILE, ADULT - NSICDXPASTSURGICALHX_GEN_ALL_CORE_FT
PAST SURGICAL HISTORY:  H/O lumpectomy 2013 - left breast    History of back surgery lumbar fusion - L2-3 - hardware

## 2021-08-19 NOTE — ASU PATIENT PROFILE, ADULT - NSICDXPASTMEDICALHX_GEN_ALL_CORE_FT
PAST MEDICAL HISTORY:  Breast CA Left breast lumpectomy - 2013 - Chemo /RT    HTN (hypertension)     Hyperparathyroidism     Hypothyroid     Lumbar disc disease

## 2021-08-20 ENCOUNTER — RESULT REVIEW (OUTPATIENT)
Age: 56
End: 2021-08-20

## 2021-08-20 ENCOUNTER — OUTPATIENT (OUTPATIENT)
Dept: OUTPATIENT SERVICES | Facility: HOSPITAL | Age: 56
LOS: 1 days | Discharge: HOME | End: 2021-08-20
Payer: COMMERCIAL

## 2021-08-20 ENCOUNTER — APPOINTMENT (OUTPATIENT)
Dept: SURGERY | Facility: HOSPITAL | Age: 56
End: 2021-08-20

## 2021-08-20 VITALS
HEIGHT: 67.5 IN | HEART RATE: 65 BPM | DIASTOLIC BLOOD PRESSURE: 86 MMHG | TEMPERATURE: 98 F | WEIGHT: 167.99 LBS | SYSTOLIC BLOOD PRESSURE: 133 MMHG | OXYGEN SATURATION: 100 % | RESPIRATION RATE: 18 BRPM

## 2021-08-20 VITALS
SYSTOLIC BLOOD PRESSURE: 103 MMHG | RESPIRATION RATE: 12 BRPM | OXYGEN SATURATION: 99 % | DIASTOLIC BLOOD PRESSURE: 67 MMHG | HEART RATE: 57 BPM

## 2021-08-20 DIAGNOSIS — Z98.890 OTHER SPECIFIED POSTPROCEDURAL STATES: Chronic | ICD-10-CM

## 2021-08-20 DIAGNOSIS — E21.0 PRIMARY HYPERPARATHYROIDISM: ICD-10-CM

## 2021-08-20 LAB
HCG UR QL: NEGATIVE — SIGNIFICANT CHANGE UP
PTH INTACT, INTRAOP SPECIMEN 2: SIGNIFICANT CHANGE UP
PTH INTACT, INTRAOP SPECIMEN 3: SIGNIFICANT CHANGE UP
PTH INTACT, INTRAOP SPECIMEN 4: SIGNIFICANT CHANGE UP
PTH INTACT, INTRAOP SPECIMEN 5: SIGNIFICANT CHANGE UP
PTH INTACT, INTRAOP SPECIMEN 6: SIGNIFICANT CHANGE UP
PTH INTACT, INTRAOP TIMING 2: SIGNIFICANT CHANGE UP
PTH INTACT, INTRAOP TIMING 3: SIGNIFICANT CHANGE UP
PTH INTACT, INTRAOP TIMING 4: SIGNIFICANT CHANGE UP
PTH INTACT, INTRAOP TIMING 5: SIGNIFICANT CHANGE UP
PTH INTACT, INTRAOP TIMING 6: SIGNIFICANT CHANGE UP
PTH INTACT, INTRAOPERATIVE 2: 63 PG/ML — SIGNIFICANT CHANGE UP (ref 15–65)
PTH INTACT, INTRAOPERATIVE 3: 31 PG/ML — SIGNIFICANT CHANGE UP (ref 15–65)
PTH INTACT, INTRAOPERATIVE 4: 22 PG/ML — SIGNIFICANT CHANGE UP (ref 15–65)
PTH INTACT, INTRAOPERATIVE 5: 18 PG/ML — SIGNIFICANT CHANGE UP (ref 15–65)
PTH INTACT, INTRAOPERATIVE 6: 16 PG/ML — SIGNIFICANT CHANGE UP (ref 15–65)
PTH-INTACT IO % DIF SERPL: 78 PG/ML — HIGH (ref 15–65)

## 2021-08-20 PROCEDURE — 60500 EXPLORE PARATHYROID GLANDS: CPT

## 2021-08-20 PROCEDURE — 88331 PATH CONSLTJ SURG 1 BLK 1SPC: CPT | Mod: 26

## 2021-08-20 PROCEDURE — 38510 BIOPSY/REMOVAL LYMPH NODES: CPT | Mod: 59

## 2021-08-20 PROCEDURE — 88305 TISSUE EXAM BY PATHOLOGIST: CPT | Mod: 26

## 2021-08-20 RX ORDER — SODIUM CHLORIDE 9 MG/ML
1000 INJECTION, SOLUTION INTRAVENOUS
Refills: 0 | Status: DISCONTINUED | OUTPATIENT
Start: 2021-08-20 | End: 2021-09-08

## 2021-08-20 RX ORDER — AMLODIPINE BESYLATE 2.5 MG/1
1 TABLET ORAL
Qty: 0 | Refills: 0 | DISCHARGE

## 2021-08-20 RX ORDER — ACETAMINOPHEN 500 MG
650 TABLET ORAL EVERY 6 HOURS
Refills: 0 | Status: DISCONTINUED | OUTPATIENT
Start: 2021-08-20 | End: 2021-09-08

## 2021-08-20 RX ORDER — OXYCODONE AND ACETAMINOPHEN 5; 325 MG/1; MG/1
1 TABLET ORAL EVERY 6 HOURS
Refills: 0 | Status: DISCONTINUED | OUTPATIENT
Start: 2021-08-20 | End: 2021-08-20

## 2021-08-20 RX ORDER — CALCIUM CARBONATE 500(1250)
1 TABLET ORAL
Qty: 0 | Refills: 0 | DISCHARGE
Start: 2021-08-20

## 2021-08-20 RX ORDER — ACETAMINOPHEN 500 MG
2 TABLET ORAL
Qty: 0 | Refills: 0 | DISCHARGE

## 2021-08-20 RX ORDER — BENZOCAINE AND MENTHOL 5; 1 G/100ML; G/100ML
1 LIQUID ORAL
Refills: 0 | Status: DISCONTINUED | OUTPATIENT
Start: 2021-08-20 | End: 2021-09-08

## 2021-08-20 RX ORDER — CALCIUM CARBONATE 500(1250)
1 TABLET ORAL EVERY 6 HOURS
Refills: 0 | Status: DISCONTINUED | OUTPATIENT
Start: 2021-08-20 | End: 2021-09-08

## 2021-08-20 RX ORDER — ALPRAZOLAM 0.25 MG
1 TABLET ORAL
Qty: 0 | Refills: 0 | DISCHARGE

## 2021-08-20 RX ORDER — LEVOTHYROXINE SODIUM 125 MCG
1 TABLET ORAL
Qty: 0 | Refills: 0 | DISCHARGE

## 2021-08-20 RX ORDER — IBUPROFEN, PHENYLEPHRINE HYDROCHLORIDE 200; 10 MG/1; MG/1
1 TABLET, COATED ORAL
Qty: 0 | Refills: 0 | DISCHARGE

## 2021-08-20 RX ORDER — ACETAMINOPHEN 500 MG
2 TABLET ORAL
Qty: 0 | Refills: 0 | DISCHARGE
Start: 2021-08-20

## 2021-08-20 RX ORDER — LOSARTAN POTASSIUM 100 MG/1
1 TABLET, FILM COATED ORAL
Qty: 0 | Refills: 0 | DISCHARGE

## 2021-08-20 RX ADMIN — Medication 1 TABLET(S): at 10:43

## 2021-08-20 NOTE — BRIEF OPERATIVE NOTE - OPERATION/FINDINGS
Left superior parathyroid excision. Hemostasis achieved with electrocautery and clips. Left recurrent laryngeal nerve identified and protected intraoperatively. Paratracheal lymph nodes excised and left superior mediastinal mass excised. EBL minimal.

## 2021-08-20 NOTE — ASU PREOP CHECKLIST - LAST TOOK
sip of water with medications, amlodipine, levothyroxine, losartan and anti reflux medications/clears

## 2021-08-20 NOTE — ASU DISCHARGE PLAN (ADULT/PEDIATRIC) - NURSING INSTRUCTIONS
You received IV Tylenol for pain management at 08:30AM. Please DO NOT take any Tylenol (Acetaminophen) containing products, such as Vicodin, Percocet, Excedrin, and cold medications for the next 6 hours (until 02:30PM). DO NOT TAKE MORE THAN 3000 MG OF TYLENOL in a 24 hour period.

## 2021-08-20 NOTE — ASU DISCHARGE PLAN (ADULT/PEDIATRIC) - CARE PROVIDER_API CALL
Ayan Mae)  Plastic Surgery  [address] [place] [address] [address]  [place] [address]  Phone: [phone]  Fax: [phone]  Follow Up Time:

## 2021-08-20 NOTE — ASU DISCHARGE PLAN (ADULT/PEDIATRIC) - CALL YOUR DOCTOR IF YOU HAVE ANY OF THE FOLLOWING:
Bleeding that does not stop/Fever greater than (need to indicate Fahrenheit or Celsius)/Wound/Surgical Site with redness, or foul smelling discharge or pus Bleeding that does not stop/Swelling that gets worse/Pain not relieved by Medications/Fever greater than (need to indicate Fahrenheit or Celsius)/Wound/Surgical Site with redness, or foul smelling discharge or pus/Nausea and vomiting that does not stop

## 2021-08-24 LAB
DOPAMINE UR-MCNC: NORMAL UG/L
DOPAMINE, UR, 24HR: NORMAL UG/24 HR
EPINEPH UR-MCNC: NORMAL UG/L
EPINEPHRINE, U, 24HR: NORMAL UG/24 HR
METANEPH 24H UR-MRATE: 67 MCG/24 H
NOREPINEPH UR-MCNC: NORMAL UG/L
NOREPINEPHRINE,U,24H: NORMAL UG/24 HR
SURGICAL PATHOLOGY STUDY: SIGNIFICANT CHANGE UP

## 2021-08-25 ENCOUNTER — NON-APPOINTMENT (OUTPATIENT)
Age: 56
End: 2021-08-25

## 2021-09-02 ENCOUNTER — APPOINTMENT (OUTPATIENT)
Dept: SURGERY | Facility: CLINIC | Age: 56
End: 2021-09-02
Payer: COMMERCIAL

## 2021-09-02 PROCEDURE — 99024 POSTOP FOLLOW-UP VISIT: CPT

## 2021-09-02 NOTE — PHYSICAL EXAM
[de-identified] : well healed [Midline] : located in midline position [Normal] : orientation to person, place, and time: normal

## 2021-09-02 NOTE — ASSESSMENT
[FreeTextEntry1] : s/p parathyroidectomy\par daily care\par path discussed\par blood work\par f/u 3 months

## 2021-09-07 ENCOUNTER — NON-APPOINTMENT (OUTPATIENT)
Age: 56
End: 2021-09-07

## 2021-09-07 LAB
25(OH)D3 SERPL-MCNC: 34.8 NG/ML
CALCIUM SERPL-MCNC: 9.6 MG/DL
CALCIUM SERPL-MCNC: 9.6 MG/DL
PARATHYROID HORMONE INTACT: 27 PG/ML

## 2021-09-08 ENCOUNTER — NON-APPOINTMENT (OUTPATIENT)
Age: 56
End: 2021-09-08

## 2021-09-23 ENCOUNTER — APPOINTMENT (OUTPATIENT)
Dept: ENDOCRINOLOGY | Facility: CLINIC | Age: 56
End: 2021-09-23
Payer: COMMERCIAL

## 2021-09-23 VITALS
HEART RATE: 75 BPM | WEIGHT: 171.13 LBS | HEIGHT: 67 IN | BODY MASS INDEX: 26.86 KG/M2 | OXYGEN SATURATION: 97 % | SYSTOLIC BLOOD PRESSURE: 137 MMHG | DIASTOLIC BLOOD PRESSURE: 87 MMHG

## 2021-09-23 PROCEDURE — 36415 COLL VENOUS BLD VENIPUNCTURE: CPT

## 2021-09-23 PROCEDURE — 99214 OFFICE O/P EST MOD 30 MIN: CPT | Mod: 25

## 2021-09-23 NOTE — HISTORY OF PRESENT ILLNESS
[FreeTextEntry1] : Patient was referred here for evaluation of hypercalcemia due to hyperparathyroidism. Had left  superior parathyroid removed 9/2/2021. She has no history of kidney stones and was told that her bone density was good.  She is not taking any calcium supplements or vitamin D presently. . She did go through an early menopause age 46 most likely due to receiving chemotherapy for treatment of breast cancer. She's not on any continuous medication for this. She was started on hydrochlorothiazide for hypertension in April but her calcium was elevated prior to this. She also has a history of hypothyroidism currently on thyroid hormone replacement but believes she originally presented with an overactive thyroid. She did not receive radioactive iodine or thyroid surgery. She does have a history of a thyroid nodule on the sonogram. There is a family history of thyroid disease both her mom and sister and her mother recently had 3 parathyroid glands removed for management of hyperparathyroidism. Thyroid sonogram.

## 2021-09-23 NOTE — PHYSICAL EXAM
[Alert] : alert [No Acute Distress] : no acute distress [Normal Voice/Communication] : normal voice communication [Normal Sclera/Conjunctiva] : normal sclera/conjunctiva [EOMI] : extra ocular movement intact [Normal Outer Ear/Nose] : the ears and nose were normal in appearance [No Neck Mass] : no neck mass was observed [No Thyroid Nodules] : no palpable thyroid nodules [Well Healed Scar] : well healed scar [No Respiratory Distress] : no respiratory distress [Clear to Auscultation] : lungs were clear to auscultation bilaterally [Normal PMI] : the apical impulse was normal [Normal Rate] : heart rate was normal [No Edema] : no peripheral edema [Normal Bowel Sounds] : normal bowel sounds [Soft] : abdomen soft [No CVA Tenderness] : no ~M costovertebral angle tenderness [No Spinal Tenderness] : no spinal tenderness [Normal Gait] : normal gait [No Joint Swelling] : no joint swelling seen [Normal Reflexes] : deep tendon reflexes were 2+ and symmetric [No Tremors] : no tremors [Oriented x3] : oriented to person, place, and time [Normal Insight/Judgement] : insight and judgment were intact [de-identified] : negative [place] sign

## 2021-09-23 NOTE — ASSESSMENT
[FreeTextEntry1] : Patient with history of hyperparathyroidism she is a few weeks parathyroidectomy.  A few days later she had numbness on left side of face and body and was told to take extra TUMS and it resolved. Will call if experiences again. \par She has been hypothyroid for over 5 years. TFT stable. \par Labs done today. f/u 3 mos. BD due in Feb 2022.  [Levothyroxine] : The patient was instructed to take Levothyroxine on an empty stomach, separate from vitamins, and wait at least 30 minutes before eating

## 2021-10-04 ENCOUNTER — MED ADMIN CHARGE (OUTPATIENT)
Age: 56
End: 2021-10-04

## 2021-10-04 ENCOUNTER — APPOINTMENT (OUTPATIENT)
Dept: FAMILY MEDICINE | Facility: CLINIC | Age: 56
End: 2021-10-04
Payer: COMMERCIAL

## 2021-10-04 VITALS
SYSTOLIC BLOOD PRESSURE: 120 MMHG | WEIGHT: 171 LBS | DIASTOLIC BLOOD PRESSURE: 74 MMHG | OXYGEN SATURATION: 97 % | BODY MASS INDEX: 26.84 KG/M2 | HEART RATE: 97 BPM | HEIGHT: 67 IN | TEMPERATURE: 97.1 F

## 2021-10-04 DIAGNOSIS — Z23 ENCOUNTER FOR IMMUNIZATION: ICD-10-CM

## 2021-10-04 LAB
ALBUMIN SERPL ELPH-MCNC: 4.7 G/DL
ALP BLD-CCNC: 91 U/L
ALT SERPL-CCNC: 18 U/L
ANION GAP SERPL CALC-SCNC: 12 MMOL/L
AST SERPL-CCNC: 17 U/L
BASOPHILS # BLD AUTO: 0.05 K/UL
BASOPHILS NFR BLD AUTO: 0.7 %
BILIRUB SERPL-MCNC: 1 MG/DL
BUN SERPL-MCNC: 13 MG/DL
CALCIUM SERPL-MCNC: 9.8 MG/DL
CHLORIDE SERPL-SCNC: 103 MMOL/L
CHOLEST SERPL-MCNC: 222 MG/DL
CO2 SERPL-SCNC: 26 MMOL/L
CREAT SERPL-MCNC: 0.67 MG/DL
EOSINOPHIL # BLD AUTO: 0.1 K/UL
EOSINOPHIL NFR BLD AUTO: 1.4 %
ESTIMATED AVERAGE GLUCOSE: 114 MG/DL
GLUCOSE SERPL-MCNC: 91 MG/DL
HBA1C MFR BLD HPLC: 5.6 %
HCT VFR BLD CALC: 40.2 %
HDLC SERPL-MCNC: 56 MG/DL
HGB BLD-MCNC: 13.4 G/DL
IMM GRANULOCYTES NFR BLD AUTO: 0.3 %
LDLC SERPL CALC-MCNC: 143 MG/DL
LYMPHOCYTES # BLD AUTO: 2.31 K/UL
LYMPHOCYTES NFR BLD AUTO: 33.2 %
MAN DIFF?: NORMAL
MCHC RBC-ENTMCNC: 31.5 PG
MCHC RBC-ENTMCNC: 33.3 GM/DL
MCV RBC AUTO: 94.4 FL
MONOCYTES # BLD AUTO: 0.57 K/UL
MONOCYTES NFR BLD AUTO: 8.2 %
NEUTROPHILS # BLD AUTO: 3.9 K/UL
NEUTROPHILS NFR BLD AUTO: 56.2 %
NONHDLC SERPL-MCNC: 166 MG/DL
PLATELET # BLD AUTO: 277 K/UL
POTASSIUM SERPL-SCNC: 4.6 MMOL/L
PROT SERPL-MCNC: 7.4 G/DL
RBC # BLD: 4.26 M/UL
RBC # FLD: 12.1 %
SODIUM SERPL-SCNC: 141 MMOL/L
TRIGL SERPL-MCNC: 118 MG/DL
WBC # FLD AUTO: 6.95 K/UL

## 2021-10-04 PROCEDURE — 99214 OFFICE O/P EST MOD 30 MIN: CPT | Mod: 25

## 2021-10-04 PROCEDURE — 36415 COLL VENOUS BLD VENIPUNCTURE: CPT

## 2021-10-04 PROCEDURE — G0008: CPT

## 2021-10-04 PROCEDURE — 90686 IIV4 VACC NO PRSV 0.5 ML IM: CPT

## 2021-10-04 NOTE — ASSESSMENT
[FreeTextEntry1] : \par \par hypertension\par The patient has a diagnosis of hypertension. The diagnosis was discussed with patient and need for medication compliance and possible side affects and risks of noncompliance. Patient was told to adhere to a low salt diet and try to incorporate exercise daily.\par reevaluated today, was stopped on hctz by endo, started on amlodipine, doing well\par elevated calcium/PTH\par reviewed by with patient, PTH high as well, s/p parathyroidectomy\par \par s/p parathyroidectomy\par 08/20/2021\par doing well, reviewed notes and blood work\par \par THYROID DYSFUNCTION\par Patient had a diagnosis of thyroid disorder. Blood was drawn to assess levels of TSH and T4. Patient will continue on current dose of medications at this time unless instructed otherwise. Patient was advised to take thyroid medications on a empty stomach and to wait at least 45 minutes before eating for appropriate absorption.\par \par cholesterol\par The diagnosis of high cholesterol is established and known to the patient and provider.  Previous blood work was evaluated at time of visit and reviewed with the patient. Blood work was drawn in office and results will be reviewed and followed. The patient was counseled on a low cholesterol diet and on medication compliance. Patient was advised to generally limit foods fried in oil, high in saturated fat, cheese, eggs and red meat. Patient was advised to continue on current medications and to followup in office for necessary blood work in three months.\par \par \par \par had fit test, does not want colonoscopy\par \par anxiety \par Discussed diagnosis of anxiety with the patient and potential outcomes/side affects of treatment versus non treatment. Medications were assessed and described at length. Side affects and black box warning were discussed.  Patient was advised to continue will all medications prescribed and the need for compliance was discussed and emphasized. Patient was advised to not stop medications without discussing with a health care provider first. Patient was advised to continue psychotherapy or seek therapy if not currently attending. Patient was educated on addictive potential of controlled substances and was counseled to use only as needed and sparingly. Patient verbalized understanding of all the above.\par doing much better\par \par reflux doing better\par \par

## 2021-10-04 NOTE — HISTORY OF PRESENT ILLNESS
[FreeTextEntry8] : 56 year old female is here for a followup visit. Patient is here for medication renewals and for blood work discussion. Medications and allergies were reviewed and assessed.  There has been no new medications since the last visit. Patient is feeling well with no active changes or issues since Her last visit.\par \par \par Patient patient using xanax as needed.

## 2021-10-04 NOTE — PHYSICAL EXAM
[Well Nourished] : well nourished [Well Developed] : well developed [Clear to Auscultation] : lungs were clear to auscultation bilaterally [Regular Rhythm] : with a regular rhythm [Soft] : abdomen soft [Non Tender] : non-tender [Normal Gait] : normal gait [Normal Affect] : the affect was normal [Normal Insight/Judgement] : insight and judgment were intact [de-identified] : anxious

## 2021-10-04 NOTE — HEALTH RISK ASSESSMENT
[] : No [Yes] : Yes [No falls in past year] : Patient reported no falls in the past year [0] : 2) Feeling down, depressed, or hopeless: Not at all (0) [PHQ-2 Negative - No further assessment needed] : PHQ-2 Negative - No further assessment needed [IAQ0Glcza] : 0 [Good] : ~his/her~  mood as  good [Patient reported mammogram was normal] : Patient reported mammogram was normal [Patient reported colonoscopy was normal] : Patient reported colonoscopy was normal [Alone] : lives alone [Employed] : employed [Single] : single [# Of Children ___] : has [unfilled] children [Fully functional (bathing, dressing, toileting, transferring, walking, feeding)] : Fully functional (bathing, dressing, toileting, transferring, walking, feeding) [Fully functional (using the telephone, shopping, preparing meals, housekeeping, doing laundry, using] : Fully functional and needs no help or supervision to perform IADLs (using the telephone, shopping, preparing meals, housekeeping, doing laundry, using transportation, managing medications and managing finances) [Smoke Detector] : smoke detector [Seat Belt] :  uses seat belt [MammogramDate] : 7/2021 [MammogramComments] : breast cancer - left  [PapSmearDate] : 7/2020 [ColonoscopyComments] : 2018 [de-identified] :

## 2021-10-05 ENCOUNTER — RX RENEWAL (OUTPATIENT)
Age: 56
End: 2021-10-05

## 2021-12-14 RX ORDER — TRIAMCINOLONE ACETONIDE 1 MG/G
0.1 CREAM TOPICAL DAILY
Qty: 1 | Refills: 1 | Status: ACTIVE | COMMUNITY
Start: 2021-08-12 | End: 1900-01-01

## 2021-12-22 LAB
24R-OH-CALCIDIOL SERPL-MCNC: 75.6 PG/ML
25(OH)D3 SERPL-MCNC: 36.8 NG/ML
ALBUMIN SERPL ELPH-MCNC: 4.8 G/DL
ALP BLD-CCNC: 92 U/L
ALT SERPL-CCNC: 20 U/L
ANION GAP SERPL CALC-SCNC: 14 MMOL/L
AST SERPL-CCNC: 18 U/L
BILIRUB SERPL-MCNC: 0.8 MG/DL
BUN SERPL-MCNC: 9 MG/DL
CA-I SERPL-SCNC: 1.13 MMOL/L
CALCIUM SERPL-MCNC: 9.8 MG/DL
CALCIUM SERPL-MCNC: 9.8 MG/DL
CHLORIDE SERPL-SCNC: 102 MMOL/L
CO2 SERPL-SCNC: 25 MMOL/L
CREAT SERPL-MCNC: 0.66 MG/DL
GLUCOSE SERPL-MCNC: 85 MG/DL
MAGNESIUM SERPL-MCNC: 2 MG/DL
PARATHYROID HORMONE INTACT: 34 PG/ML
PHOSPHATE SERPL-MCNC: 3.7 MG/DL
POTASSIUM SERPL-SCNC: 4 MMOL/L
PROT SERPL-MCNC: 7.6 G/DL
SODIUM SERPL-SCNC: 140 MMOL/L
T4 FREE SERPL-MCNC: 1.7 NG/DL
T4 SERPL-MCNC: 9.2 UG/DL
TSH SERPL-ACNC: 1.47 UIU/ML

## 2021-12-27 ENCOUNTER — APPOINTMENT (OUTPATIENT)
Dept: FAMILY MEDICINE | Facility: CLINIC | Age: 56
End: 2021-12-27
Payer: COMMERCIAL

## 2021-12-27 PROCEDURE — 99442: CPT

## 2021-12-27 NOTE — ASSESSMENT
[FreeTextEntry1] : covid 19\par The symptoms that are occurring are most likely secondary to virus, therefore antibiotics are deferred at this time. Patient was told to rest, hydrate and treat symptoms as necessary. May use tylenol/ibuprofen as necessary for symptomatic relief.  If symptoms worsen or do not improve return to this office, seek care or go directly to the ER.\par \par medrol\par bromfed\par \par 11 min\par \par \par hypertension\par The patient has a diagnosis of hypertension. The diagnosis was discussed with patient and need for medication compliance and possible side affects and risks of noncompliance. Patient was told to adhere to a low salt diet and try to incorporate exercise daily.\par reevaluated today, was stopped on hctz by endo, started on amlodipine, doing well\par elevated calcium/PTH\par reviewed by with patient, PTH high as well\par must fu with endo, stopped hctz to see if will help calcium\par \par THYROID DYSFUNCTION\par Patient had a diagnosis of thyroid disorder. Blood was drawn to assess levels of TSH and T4. Patient will continue on current dose of medications at this time unless instructed otherwise. Patient was advised to take thyroid medications on a empty stomach and to wait at least 45 minutes before eating for appropriate absorption.\par \par \par had fit test, does not want colonoscopy\par \par anxiety \par Discussed diagnosis of anxiety with the patient and potential outcomes/side affects of treatment versus non treatment. Medications were assessed and described at length. Side affects and black box warning were discussed.  Patient was advised to continue will all medications prescribed and the need for compliance was discussed and emphasized. Patient was advised to not stop medications without discussing with a health care provider first. Patient was advised to continue psychotherapy or seek therapy if not currently attending. Patient was educated on addictive potential of controlled substances and was counseled to use only as needed and sparingly. Patient verbalized understanding of all the above.\par doing much better\par \par reflux doing better\par \par

## 2021-12-27 NOTE — HISTORY OF PRESENT ILLNESS
[Home] : at home, [unfilled] , at the time of the visit. [Medical Office: ([facility])___] : at the medical office located in  [Verbal consent obtained from patient] : the patient, [unfilled] [FreeTextEntry8] : 56 year old female here with complaints of testing positive for covid after being exposed at work. Patients active medications, allergies and issues were all reviewed with the patient at time of visit.\par \par \par Patient  patient using xanax as needed.

## 2021-12-27 NOTE — PHYSICAL EXAM
[Well Nourished] : well nourished [Well Developed] : well developed [Clear to Auscultation] : lungs were clear to auscultation bilaterally [Regular Rhythm] : with a regular rhythm [Normal S1, S2] : normal S1 and S2 [Soft] : abdomen soft [Non Tender] : non-tender [Normal Insight/Judgement] : insight and judgment were intact [de-identified] : anxious

## 2022-01-10 ENCOUNTER — APPOINTMENT (OUTPATIENT)
Dept: ENDOCRINOLOGY | Facility: CLINIC | Age: 57
End: 2022-01-10
Payer: COMMERCIAL

## 2022-01-10 VITALS
HEIGHT: 67 IN | WEIGHT: 169 LBS | DIASTOLIC BLOOD PRESSURE: 84 MMHG | HEART RATE: 78 BPM | OXYGEN SATURATION: 97 % | SYSTOLIC BLOOD PRESSURE: 120 MMHG | BODY MASS INDEX: 26.53 KG/M2

## 2022-01-10 PROCEDURE — 99214 OFFICE O/P EST MOD 30 MIN: CPT | Mod: 25

## 2022-01-10 PROCEDURE — 36415 COLL VENOUS BLD VENIPUNCTURE: CPT

## 2022-01-10 NOTE — ASSESSMENT
[FreeTextEntry1] : Patient with history of hyperparathyroidism she is a few mos post  parathyroidectomy.  A few days after surgery  had numbness on left side of face and body and was told to take extra TUMS and it resolved. Has remained asx off calcium.\par She has been hypothyroid for over 5 years. TFT have been stable. Repeated labs today. \par f/u 3 mos. BD due in Feb 2022. Will do f/u sonogram for thyroid nodule.  [Levothyroxine] : The patient was instructed to take Levothyroxine on an empty stomach, separate from vitamins, and wait at least 30 minutes before eating

## 2022-01-10 NOTE — PHYSICAL EXAM
[Alert] : alert [No Acute Distress] : no acute distress [Normal Voice/Communication] : normal voice communication [Normal Sclera/Conjunctiva] : normal sclera/conjunctiva [EOMI] : extra ocular movement intact [Normal Outer Ear/Nose] : the ears and nose were normal in appearance [No Neck Mass] : no neck mass was observed [No Thyroid Nodules] : no palpable thyroid nodules [Well Healed Scar] : well healed scar [No Respiratory Distress] : no respiratory distress [Clear to Auscultation] : lungs were clear to auscultation bilaterally [Normal PMI] : the apical impulse was normal [Normal Rate] : heart rate was normal [No Edema] : no peripheral edema [Normal Bowel Sounds] : normal bowel sounds [Soft] : abdomen soft [No CVA Tenderness] : no ~M costovertebral angle tenderness [No Spinal Tenderness] : no spinal tenderness [Normal Gait] : normal gait [No Joint Swelling] : no joint swelling seen [Normal Reflexes] : deep tendon reflexes were 2+ and symmetric [No Tremors] : no tremors [Oriented x3] : oriented to person, place, and time [Normal Insight/Judgement] : insight and judgment were intact [de-identified] : negative [place] sign

## 2022-01-10 NOTE — HISTORY OF PRESENT ILLNESS
[FreeTextEntry1] : Patient was referred here for evaluation of hypercalcemia due to hyperparathyroidism. Had left  superior parathyroid removed 9/2/2021. She has no history of kidney stones and was told that her bone density was good.  She is not taking any calcium supplements or vitamin D presently. . She did go through an early menopause age 46 most likely due to receiving chemotherapy for treatment of breast cancer. She's not on any continuous medication for this. She was started on hydrochlorothiazide for hypertension in April but her calcium was elevated prior to this. She also has a history of hypothyroidism currently on thyroid hormone replacement but believes she originally presented with an overactive thyroid. She did not receive radioactive iodine or thyroid surgery. She does have a history of a thyroid nodule on the sonogram. There is a family history of thyroid disease both her mom and sister and her mother recently had 3 parathyroid glands removed for management of hyperparathyroidism. Thyroid sonogram. \par Generally feels well. despite having Covid vaccine and booster and Covid. mild case Rodo 2021.

## 2022-02-03 ENCOUNTER — APPOINTMENT (OUTPATIENT)
Dept: SURGERY | Facility: CLINIC | Age: 57
End: 2022-02-03
Payer: COMMERCIAL

## 2022-02-03 PROCEDURE — 36415 COLL VENOUS BLD VENIPUNCTURE: CPT

## 2022-02-03 PROCEDURE — 99213 OFFICE O/P EST LOW 20 MIN: CPT | Mod: 25

## 2022-02-03 NOTE — PHYSICAL EXAM
[de-identified] : well healed scar [Midline] : located in midline position [Normal] : orientation to person, place, and time: normal

## 2022-02-03 NOTE — HISTORY OF PRESENT ILLNESS
[de-identified] : Pt 3 months s/p parathyroidectomy doing well without complaints had blood work by Dr Segura all reviewed

## 2022-03-10 LAB
24R-OH-CALCIDIOL SERPL-MCNC: 67.5 PG/ML
25(OH)D3 SERPL-MCNC: 34.6 NG/ML
ALBUMIN SERPL ELPH-MCNC: 4.7 G/DL
ALP BLD-CCNC: 77 U/L
ALT SERPL-CCNC: 29 U/L
ANION GAP SERPL CALC-SCNC: 12 MMOL/L
AST SERPL-CCNC: 22 U/L
BILIRUB SERPL-MCNC: 0.8 MG/DL
BUN SERPL-MCNC: 9 MG/DL
CALCIUM SERPL-MCNC: 9.7 MG/DL
CALCIUM SERPL-MCNC: 9.7 MG/DL
CHLORIDE SERPL-SCNC: 103 MMOL/L
CHOLEST SERPL-MCNC: 220 MG/DL
CO2 SERPL-SCNC: 28 MMOL/L
COVID-19 NUCLEOCAPSID  GAM ANTIBODY INTERPRETATION: NEGATIVE
COVID-19 SPIKE DOMAIN ANTIBODY INTERPRETATION: POSITIVE
CREAT SERPL-MCNC: 0.68 MG/DL
ESTIMATED AVERAGE GLUCOSE: 117 MG/DL
GLUCOSE SERPL-MCNC: 91 MG/DL
HBA1C MFR BLD HPLC: 5.7 %
HDLC SERPL-MCNC: 54 MG/DL
LDLC SERPL CALC-MCNC: 142 MG/DL
NONHDLC SERPL-MCNC: 166 MG/DL
PARATHYROID HORMONE INTACT: 47 PG/ML
POTASSIUM SERPL-SCNC: 4.2 MMOL/L
PROT SERPL-MCNC: 7.6 G/DL
SARS-COV-2 AB SERPL IA-ACNC: >250 U/ML
SARS-COV-2 AB SERPL QL IA: 0.08 INDEX
SODIUM SERPL-SCNC: 143 MMOL/L
T4 FREE SERPL-MCNC: 1.6 NG/DL
T4 SERPL-MCNC: 9 UG/DL
TRIGL SERPL-MCNC: 123 MG/DL
TSH SERPL-ACNC: 1.64 UIU/ML

## 2022-03-11 ENCOUNTER — RX RENEWAL (OUTPATIENT)
Age: 57
End: 2022-03-11

## 2022-03-11 RX ORDER — VALACYCLOVIR 500 MG/1
500 TABLET, FILM COATED ORAL TWICE DAILY
Qty: 180 | Refills: 3 | Status: ACTIVE | COMMUNITY
Start: 2017-11-03 | End: 1900-01-01

## 2022-03-28 ENCOUNTER — APPOINTMENT (OUTPATIENT)
Dept: FAMILY MEDICINE | Facility: CLINIC | Age: 57
End: 2022-03-28
Payer: COMMERCIAL

## 2022-03-28 VITALS
DIASTOLIC BLOOD PRESSURE: 84 MMHG | WEIGHT: 168 LBS | TEMPERATURE: 98 F | BODY MASS INDEX: 26.37 KG/M2 | HEIGHT: 67 IN | OXYGEN SATURATION: 97 % | HEART RATE: 97 BPM | SYSTOLIC BLOOD PRESSURE: 120 MMHG

## 2022-03-28 DIAGNOSIS — R59.0 LOCALIZED ENLARGED LYMPH NODES: ICD-10-CM

## 2022-03-28 LAB
25(OH)D3 SERPL-MCNC: 29.5 NG/ML
ALBUMIN SERPL ELPH-MCNC: 4.9 G/DL
ALP BLD-CCNC: 79 U/L
ALT SERPL-CCNC: 34 U/L
ANION GAP SERPL CALC-SCNC: 13 MMOL/L
APPEARANCE: CLEAR
AST SERPL-CCNC: 25 U/L
BASOPHILS # BLD AUTO: 0.07 K/UL
BASOPHILS NFR BLD AUTO: 0.9 %
BILIRUB SERPL-MCNC: 0.9 MG/DL
BILIRUBIN URINE: NEGATIVE
BLOOD URINE: NEGATIVE
BUN SERPL-MCNC: 11 MG/DL
CALCIUM SERPL-MCNC: 9.7 MG/DL
CHLORIDE SERPL-SCNC: 102 MMOL/L
CHOLEST SERPL-MCNC: 210 MG/DL
CO2 SERPL-SCNC: 26 MMOL/L
COLOR: COLORLESS
CREAT SERPL-MCNC: 0.69 MG/DL
EGFR: 103 ML/MIN/1.73M2
EOSINOPHIL # BLD AUTO: 0.11 K/UL
EOSINOPHIL NFR BLD AUTO: 1.4 %
ESTIMATED AVERAGE GLUCOSE: 114 MG/DL
GLUCOSE QUALITATIVE U: NEGATIVE
GLUCOSE SERPL-MCNC: 103 MG/DL
HBA1C MFR BLD HPLC: 5.6 %
HCT VFR BLD CALC: 41.4 %
HDLC SERPL-MCNC: 59 MG/DL
HGB BLD-MCNC: 13.5 G/DL
IMM GRANULOCYTES NFR BLD AUTO: 0.3 %
KETONES URINE: NEGATIVE
LDLC SERPL CALC-MCNC: 137 MG/DL
LEUKOCYTE ESTERASE URINE: NEGATIVE
LYMPHOCYTES # BLD AUTO: 2.74 K/UL
LYMPHOCYTES NFR BLD AUTO: 34.6 %
MAN DIFF?: NORMAL
MCHC RBC-ENTMCNC: 30.7 PG
MCHC RBC-ENTMCNC: 32.6 GM/DL
MCV RBC AUTO: 94.1 FL
MONOCYTES # BLD AUTO: 0.65 K/UL
MONOCYTES NFR BLD AUTO: 8.2 %
NEUTROPHILS # BLD AUTO: 4.33 K/UL
NEUTROPHILS NFR BLD AUTO: 54.6 %
NITRITE URINE: NEGATIVE
NONHDLC SERPL-MCNC: 152 MG/DL
PH URINE: 6
PLATELET # BLD AUTO: 271 K/UL
POTASSIUM SERPL-SCNC: 4.2 MMOL/L
PROT SERPL-MCNC: 7.7 G/DL
PROTEIN URINE: NEGATIVE
RBC # BLD: 4.4 M/UL
RBC # FLD: 12 %
SODIUM SERPL-SCNC: 141 MMOL/L
SPECIFIC GRAVITY URINE: 1.01
TRIGL SERPL-MCNC: 73 MG/DL
TSH SERPL-ACNC: 1.27 UIU/ML
UROBILINOGEN URINE: NORMAL
WBC # FLD AUTO: 7.92 K/UL

## 2022-03-28 PROCEDURE — 36415 COLL VENOUS BLD VENIPUNCTURE: CPT

## 2022-03-28 PROCEDURE — 99396 PREV VISIT EST AGE 40-64: CPT | Mod: 25

## 2022-03-28 RX ORDER — METHYLPREDNISOLONE 4 MG/1
4 TABLET ORAL
Qty: 21 | Refills: 0 | Status: DISCONTINUED | COMMUNITY
Start: 2021-12-27 | End: 2022-03-28

## 2022-03-28 NOTE — HISTORY OF PRESENT ILLNESS
[Verbal consent obtained from patient] : the patient, [unfilled] [FreeTextEntry8] : 56 year old female  here for annual well visit. Patient's blood work was drawn and medications reviewed. Patient's past medical history was reviewed, allergies verified and problems were identified and assessed. Patients medications were reviewed. Patient is feeling well with no new or active complaints at this time.\par \par Patient  patient using xanax as needed.

## 2022-03-28 NOTE — HEALTH RISK ASSESSMENT
[Excellent] : ~his/her~  mood as  excellent [Never] : Never [Yes] : Yes [No falls in past year] : Patient reported no falls in the past year [0] : 2) Feeling down, depressed, or hopeless: Not at all (0) [[id number]] : 0 [Patient reported mammogram was normal] : Patient reported mammogram was normal [Patient reported PAP Smear was normal] : Patient reported PAP Smear was normal [Patient reported bone density results were normal] : Patient reported bone density results were normal [Patient reported colonoscopy was normal] : Patient reported colonoscopy was normal [Alone] : lives alone [Single] : single [Employed] : employed [# Of Children ___] : has [unfilled] children [Fully functional (bathing, dressing, toileting, transferring, walking, feeding)] : Fully functional (bathing, dressing, toileting, transferring, walking, feeding) [Fully functional (using the telephone, shopping, preparing meals, housekeeping, doing laundry, using] : Fully functional and needs no help or supervision to perform IADLs (using the telephone, shopping, preparing meals, housekeeping, doing laundry, using transportation, managing medications and managing finances) [Smoke Detector] : smoke detector [Seat Belt] :  uses seat belt [MammogramDate] : 03/2022 [MammogramComments] : breast cancer - left  - had mRI [PapSmearDate] : 7/2020 [BoneDensityDate] : 2020 [ColonoscopyComments] : 2018 [de-identified] :

## 2022-03-28 NOTE — ASSESSMENT
[FreeTextEntry1] : Patient was counseled on healthy eating habits, on daily exercise and stress relief. All medications and allergies were reviewed with the patient and any adjustments necessary were made. Patient was counseled to try engage in an exercise activity for at least 30 minutes 3-4 times a week.  Patient was advised to eat a diet low in fat and carbohydrates and high in protein, with plenty of vegetables. Patient was advised to try and engage in relaxing activities whenever possible.\par The patients blood was draw in office and will be followed and assessed for any issues.  Patient was told to return to the office if any issues arise.  Unless otherwise stated, the patient is to continue all other medications as previously prescribed.\par \par elevated calcium\par s/p parathyroidectomy 8/20/21 - doing well, calcium normalized\par \par cholesterol\par The diagnosis of high cholesterol is established and known to the patient and provider.  Previous blood work was evaluated at time of visit and reviewed with the patient. Blood work was drawn in office and results will be reviewed and followed. The patient was counseled on a low cholesterol diet and on medication compliance. Patient was advised to generally limit foods fried in oil, high in saturated fat, cheese, eggs and red meat. \par 03/28/22 ldl 142\par will recheck\par \par hypertension\par The patient has a diagnosis of hypertension. The diagnosis was discussed with patient and need for medication compliance and possible side affects and risks of noncompliance. Patient was told to adhere to a low salt diet and try to incorporate exercise daily.\par reevaluated today, was stopped on hctz by endo, started on amlodipine, doing well\par elevated calcium/PTH\par reviewed by with patient, PTH high as well\par 10/21 must fu with endo, stopped hctz to see if will help calcium\par seeing endo, doing well\par \par THYROID DYSFUNCTION\par Patient had a diagnosis of thyroid disorder. Blood was drawn to assess levels of TSH and T4. Patient will continue on current dose of medications at this time unless instructed otherwise. Patient was advised to take thyroid medications on a empty stomach and to wait at least 45 minutes before eating for appropriate absorption.\par \par had fit test, does not want colonoscopy\par \par anxiety \par Discussed diagnosis of anxiety with the patient and potential outcomes/side affects of treatment versus non treatment. Medications were assessed and described at length. Side affects and black box warning were discussed.  Patient was advised to continue will all medications prescribed and the need for compliance was discussed and emphasized. Patient was advised to not stop medications without discussing with a health care provider first. Patient was advised to continue psychotherapy or seek therapy if not currently attending. Patient was educated on addictive potential of controlled substances and was counseled to use only as needed and sparingly. Patient verbalized understanding of all the above.\par doing much better\par \par reflux doing better\par \par

## 2022-03-28 NOTE — PHYSICAL EXAM
[Well Nourished] : well nourished [Well Developed] : well developed [Clear to Auscultation] : lungs were clear to auscultation bilaterally [Regular Rhythm] : with a regular rhythm [Normal S1, S2] : normal S1 and S2 [Soft] : abdomen soft [Non Tender] : non-tender [Normal Affect] : the affect was normal [Normal Insight/Judgement] : insight and judgment were intact [de-identified] : anxious

## 2022-04-11 PROBLEM — Z11.59 SCREENING FOR VIRAL DISEASE: Status: ACTIVE | Noted: 2020-06-19

## 2022-04-29 ENCOUNTER — APPOINTMENT (OUTPATIENT)
Dept: FAMILY MEDICINE | Facility: CLINIC | Age: 57
End: 2022-04-29

## 2022-05-13 ENCOUNTER — APPOINTMENT (OUTPATIENT)
Dept: ORTHOPEDIC SURGERY | Facility: CLINIC | Age: 57
End: 2022-05-13

## 2022-05-13 ENCOUNTER — APPOINTMENT (OUTPATIENT)
Dept: ORTHOPEDIC SURGERY | Facility: CLINIC | Age: 57
End: 2022-05-13
Payer: COMMERCIAL

## 2022-05-13 VITALS — BODY MASS INDEX: 26.37 KG/M2 | HEIGHT: 67 IN | WEIGHT: 168 LBS

## 2022-05-13 PROCEDURE — 99203 OFFICE O/P NEW LOW 30 MIN: CPT

## 2022-05-14 NOTE — PHYSICAL EXAM
[5___] : supination 5[unfilled]/5 [] : light touch intact [Bilateral] : elbow bilaterally [Outside films reviewed] : Outside films reviewed [Fracture] : Fracture [FreeTextEntry1] : nondisplaced radial head fracture b/l elbows [TWNoteComboBox7] : flexion 90 degrees [TWNoteComboBox6] : pronation 0 degrees [TWNoteComboBox4] : extension <0 degrees [de-identified] : False

## 2022-05-14 NOTE — HISTORY OF PRESENT ILLNESS
[Left Arm] : left arm [Right Arm] : right arm [9] : 9 [0] : 0 [Dull/Aching] : dull/aching [Localized] : localized [Intermittent] : intermittent [Full time] : Work status: full time [de-identified] : 56 YF with b/l elbow injury.  She tripped and fell yesterday.  She was seen in an UC and was diagnosed with radial head fractures.  She was treated in slings for comfort. [] : Post Surgical Visit: no [FreeTextEntry3] : 5/12/22 [FreeTextEntry5] : patient went over a dog gate tripped and fell on concrete, hurt both arms\par  [FreeTextEntry9] : movement

## 2022-05-14 NOTE — ASSESSMENT
[FreeTextEntry1] : She is advised to continue slings for comfort.  No heavy lifting.  NSAID's and tylenol for pain.  She may ice prn.  No work.  f/u in 1 week with Dr Javier.

## 2022-05-19 ENCOUNTER — APPOINTMENT (OUTPATIENT)
Dept: ORTHOPEDIC SURGERY | Facility: CLINIC | Age: 57
End: 2022-05-19
Payer: COMMERCIAL

## 2022-05-19 VITALS — HEIGHT: 67 IN | BODY MASS INDEX: 26.37 KG/M2 | WEIGHT: 168 LBS

## 2022-05-19 PROCEDURE — 99213 OFFICE O/P EST LOW 20 MIN: CPT | Mod: 25

## 2022-05-19 PROCEDURE — 24650 CLTX RDL HEAD/NCK FX WO MNPJ: CPT

## 2022-05-19 NOTE — RETURN TO WORK/SCHOOL
[Return Date: _____] : as of [unfilled].  This has been discussed in detail with ~Jose~ and ~he/she~ understands this. [Full Time Work] : full time work

## 2022-05-19 NOTE — DATA REVIEWED
[Outside X-rays] : outside x-rays [Bilateral] : of the bilateral [Elbow] : elbow [I independently reviewed and interpreted images and report] : I independently reviewed and interpreted images and report [FreeTextEntry1] : fracture of b/l radial heads

## 2022-06-03 ENCOUNTER — APPOINTMENT (OUTPATIENT)
Dept: FAMILY MEDICINE | Facility: CLINIC | Age: 57
End: 2022-06-03
Payer: COMMERCIAL

## 2022-06-03 DIAGNOSIS — U07.1 COVID-19: ICD-10-CM

## 2022-06-03 DIAGNOSIS — J01.90 ACUTE SINUSITIS, UNSPECIFIED: ICD-10-CM

## 2022-06-03 PROCEDURE — 99213 OFFICE O/P EST LOW 20 MIN: CPT | Mod: 95

## 2022-06-03 NOTE — PHYSICAL EXAM
[Well Nourished] : well nourished [Well Developed] : well developed [Soft] : abdomen soft [Normal Affect] : the affect was normal [Normal Insight/Judgement] : insight and judgment were intact [de-identified] : anxious

## 2022-06-03 NOTE — HISTORY OF PRESENT ILLNESS
[FreeTextEntry8] : 56 year old female here with complaints of having covid 19, second time. Patients active medications, allergies and issues were all reviewed with the patient at time of visit.\par \par Patient  patient using xanax as needed.  [Home] : at home, [unfilled] , at the time of the visit. [Medical Office: ([facility])___] : at the medical office located in  [Verbal consent obtained from patient] : the patient, [unfilled]

## 2022-06-03 NOTE — HEALTH RISK ASSESSMENT
[Never] : Never [Yes] : Yes [No falls in past year] : Patient reported no falls in the past year [0] : 2) Feeling down, depressed, or hopeless: Not at all (0) [[id number]] : 0 [Excellent] : ~his/her~  mood as  excellent [Patient reported mammogram was normal] : Patient reported mammogram was normal [Patient reported PAP Smear was normal] : Patient reported PAP Smear was normal [Patient reported bone density results were normal] : Patient reported bone density results were normal [Patient reported colonoscopy was normal] : Patient reported colonoscopy was normal [Alone] : lives alone [Employed] : employed [Single] : single [# Of Children ___] : has [unfilled] children [Fully functional (bathing, dressing, toileting, transferring, walking, feeding)] : Fully functional (bathing, dressing, toileting, transferring, walking, feeding) [Fully functional (using the telephone, shopping, preparing meals, housekeeping, doing laundry, using] : Fully functional and needs no help or supervision to perform IADLs (using the telephone, shopping, preparing meals, housekeeping, doing laundry, using transportation, managing medications and managing finances) [Smoke Detector] : smoke detector [Seat Belt] :  uses seat belt [MammogramDate] : 03/2022 [MammogramComments] : breast cancer - left  - had mRI [PapSmearDate] : 7/2020 [BoneDensityDate] : 2020 [ColonoscopyComments] : 2018 [de-identified] :

## 2022-06-03 NOTE — ASSESSMENT
[FreeTextEntry1] : covid 19/acute sinusitis\par Patient was advised to take all medications as prescribed and to finish any antibiotics in their entirety. Patient was told to rest, hydrate and treat symptoms as necessary. Patient was advised to return to this office or go directly to the ER if symptoms do not improve or if any worsening occurs.\par azithro\par cough medicine\par if no improvement, ER.\par \par elevated calcium\par s/p parathyroidectomy 8/20/21 - doing well, calcium normalized\par \par cholesterol\par The diagnosis of high cholesterol is established and known to the patient and provider.  Previous blood work was evaluated at time of visit and reviewed with the patient. Blood work was drawn in office and results will be reviewed and followed. The patient was counseled on a low cholesterol diet and on medication compliance. Patient was advised to generally limit foods fried in oil, high in saturated fat, cheese, eggs and red meat. \par 03/28/22 ldl 142\par will recheck next visit \par \par hypertension\par The patient has a diagnosis of hypertension. The diagnosis was discussed with patient and need for medication compliance and possible side affects and risks of noncompliance. Patient was told to adhere to a low salt diet and try to incorporate exercise daily.\par reevaluated today, was stopped on hctz by endo, started on amlodipine, doing well\par elevated calcium/PTH\par reviewed by with patient, PTH high as well\par 10/21 must fu with endo, stopped hctz to see if will help calcium\par seeing endo, doing well\par \par THYROID DYSFUNCTION\par Patient had a diagnosis of thyroid disorder. Blood was drawn to assess levels of TSH and T4. Patient will continue on current dose of medications at this time unless instructed otherwise. Patient was advised to take thyroid medications on a empty stomach and to wait at least 45 minutes before eating for appropriate absorption.\par \par had fit test, does not want colonoscopy\par \par anxiety \par Discussed diagnosis of anxiety with the patient and potential outcomes/side affects of treatment versus non treatment. Medications were assessed and described at length. Side affects and black box warning were discussed.  Patient was advised to continue will all medications prescribed and the need for compliance was discussed and emphasized. Patient was advised to not stop medications without discussing with a health care provider first. Patient was advised to continue psychotherapy or seek therapy if not currently attending. Patient was educated on addictive potential of controlled substances and was counseled to use only as needed and sparingly. Patient verbalized understanding of all the above.\par doing much better\par \par reflux doing better\par \par

## 2022-06-06 ENCOUNTER — TRANSCRIPTION ENCOUNTER (OUTPATIENT)
Age: 57
End: 2022-06-06

## 2022-06-08 ENCOUNTER — RX RENEWAL (OUTPATIENT)
Age: 57
End: 2022-06-08

## 2022-06-08 RX ORDER — LEVOCETIRIZINE DIHYDROCHLORIDE 5 MG/1
5 TABLET ORAL
Qty: 90 | Refills: 3 | Status: ACTIVE | COMMUNITY
Start: 2020-04-30 | End: 1900-01-01

## 2022-06-17 ENCOUNTER — NON-APPOINTMENT (OUTPATIENT)
Age: 57
End: 2022-06-17

## 2022-07-05 ENCOUNTER — APPOINTMENT (OUTPATIENT)
Dept: ORTHOPEDIC SURGERY | Facility: CLINIC | Age: 57
End: 2022-07-05

## 2022-07-05 VITALS — HEIGHT: 67 IN | BODY MASS INDEX: 26.37 KG/M2 | WEIGHT: 168 LBS

## 2022-07-05 DIAGNOSIS — S52.125A NONDISPLACED FRACTURE OF HEAD OF LEFT RADIUS, INITIAL ENCOUNTER FOR CLOSED FRACTURE: ICD-10-CM

## 2022-07-05 DIAGNOSIS — S52.124A NONDISPLACED FRACTURE OF HEAD OF RIGHT RADIUS, INITIAL ENCOUNTER FOR CLOSED FRACTURE: ICD-10-CM

## 2022-07-05 PROCEDURE — 73080 X-RAY EXAM OF ELBOW: CPT | Mod: 50

## 2022-07-05 PROCEDURE — 99024 POSTOP FOLLOW-UP VISIT: CPT

## 2022-07-05 NOTE — PHYSICAL EXAM
[de-identified] : b/l elbows:\par No swelling/ecchymosis\par Mild TTP over right radial head, no TTP over left\par FAROM\par NVID

## 2022-07-05 NOTE — IMAGING
[Bilateral] : elbow bilaterally [The fracture is in acceptable alignment. There is progression in healing seen] : The fracture is in acceptable alignment. There is progression in healing seen

## 2022-07-05 NOTE — HISTORY OF PRESENT ILLNESS
[0] : 0 [Full time] : Work status: full time [1] : 2 [] : Post Surgical Visit: no [FreeTextEntry1] : both elbows [de-identified] : x-ray

## 2022-07-07 ENCOUNTER — APPOINTMENT (OUTPATIENT)
Dept: SURGERY | Facility: CLINIC | Age: 57
End: 2022-07-07

## 2022-07-07 PROCEDURE — 99213 OFFICE O/P EST LOW 20 MIN: CPT

## 2022-07-07 RX ORDER — BLOOD SUGAR DIAGNOSTIC
STRIP MISCELLANEOUS
Qty: 8 | Refills: 0 | Status: ACTIVE | COMMUNITY
Start: 2022-04-15

## 2022-07-07 NOTE — PHYSICAL EXAM
[de-identified] : well helaed scar [Midline] : located in midline position [Normal] : orientation to person, place, and time: normal [de-identified] : Neg [place]'s sign

## 2022-07-07 NOTE — HISTORY OF PRESENT ILLNESS
[de-identified] : Pt 9 months s/p parathyroidectomy doing well without complaints all reports reviewed

## 2022-08-22 ENCOUNTER — APPOINTMENT (OUTPATIENT)
Dept: ENDOCRINOLOGY | Facility: CLINIC | Age: 57
End: 2022-08-22

## 2022-08-22 VITALS
DIASTOLIC BLOOD PRESSURE: 86 MMHG | HEIGHT: 67 IN | SYSTOLIC BLOOD PRESSURE: 147 MMHG | HEART RATE: 91 BPM | OXYGEN SATURATION: 97 % | BODY MASS INDEX: 27.35 KG/M2 | WEIGHT: 174.25 LBS

## 2022-08-22 PROCEDURE — 36415 COLL VENOUS BLD VENIPUNCTURE: CPT

## 2022-08-22 PROCEDURE — 99214 OFFICE O/P EST MOD 30 MIN: CPT | Mod: 25

## 2022-08-22 NOTE — HISTORY OF PRESENT ILLNESS
[FreeTextEntry1] : Patient was referred here for evaluation of hypercalcemia due to hyperparathyroidism. Had left  superior parathyroid removed 9/2/2021. She has no history of kidney stones and was told that her bone density was good.  She is not taking any calcium supplements or vitamin D presently. . She did go through an early menopause age 46 most likely due to receiving chemotherapy for treatment of breast cancer. She's not on any continuous medication for this. She was started on hydrochlorothiazide for hypertension in April but her calcium was elevated prior to this. She also has a history of hypothyroidism currently on thyroid hormone replacement but believes she originally presented with an overactive thyroid. She did not receive radioactive iodine or thyroid surgery. She does have a history of a thyroid nodule on the sonogram. There is a family history of thyroid disease both her mom and sister and her mother recently had 3 parathyroid glands removed for management of hyperparathyroidism. \par Generally feels well. despite having Covid vaccine and booster and Covid. mild case [place] 2021.

## 2022-08-22 NOTE — PHYSICAL EXAM
[Alert] : alert [No Acute Distress] : no acute distress [Normal Voice/Communication] : normal voice communication [Normal Sclera/Conjunctiva] : normal sclera/conjunctiva [EOMI] : extra ocular movement intact [Normal Outer Ear/Nose] : the ears and nose were normal in appearance [No Neck Mass] : no neck mass was observed [No Thyroid Nodules] : no palpable thyroid nodules [Well Healed Scar] : well healed scar [No Respiratory Distress] : no respiratory distress [Clear to Auscultation] : lungs were clear to auscultation bilaterally [Normal PMI] : the apical impulse was normal [Normal Rate] : heart rate was normal [No Edema] : no peripheral edema [Normal Bowel Sounds] : normal bowel sounds [Soft] : abdomen soft [No CVA Tenderness] : no ~M costovertebral angle tenderness [No Spinal Tenderness] : no spinal tenderness [Normal Gait] : normal gait [No Joint Swelling] : no joint swelling seen [Normal Reflexes] : deep tendon reflexes were 2+ and symmetric [No Tremors] : no tremors [Oriented x3] : oriented to person, place, and time [Normal Insight/Judgement] : insight and judgment were intact [de-identified] : negative [place] sign

## 2022-08-22 NOTE — ASSESSMENT
[FreeTextEntry1] : Patient with history of hyperparathyroidism she is a few years post  parathyroidectomy.  A few days after surgery  had numbness on left side of face and body and was told to take extra TUMS and it resolved. Has remained asx off calcium.\par She has been hypothyroid for over 5 years. TFT have been stable. Repeated labs today. \par f/u 3 mos. BD  showed increase loss L 1 T score - 1.7, has h/o spinal fusion. thyroid nodule stable.  [Carbohydrate Consistent Diet] : carbohydrate consistent diet

## 2022-09-06 ENCOUNTER — APPOINTMENT (OUTPATIENT)
Dept: ORTHOPEDIC SURGERY | Facility: CLINIC | Age: 57
End: 2022-09-06

## 2022-09-23 ENCOUNTER — APPOINTMENT (OUTPATIENT)
Dept: FAMILY MEDICINE | Facility: CLINIC | Age: 57
End: 2022-09-23

## 2022-09-23 VITALS
HEIGHT: 67 IN | TEMPERATURE: 97.8 F | WEIGHT: 171 LBS | OXYGEN SATURATION: 98 % | SYSTOLIC BLOOD PRESSURE: 130 MMHG | HEART RATE: 81 BPM | BODY MASS INDEX: 26.84 KG/M2 | DIASTOLIC BLOOD PRESSURE: 80 MMHG

## 2022-09-23 DIAGNOSIS — R79.89 OTHER SPECIFIED ABNORMAL FINDINGS OF BLOOD CHEMISTRY: ICD-10-CM

## 2022-09-23 PROCEDURE — 36415 COLL VENOUS BLD VENIPUNCTURE: CPT

## 2022-09-23 PROCEDURE — 99214 OFFICE O/P EST MOD 30 MIN: CPT | Mod: 25

## 2022-09-23 RX ORDER — BROMPHENIRAMINE MALEATE, PSEUDOEPHEDRINE HYDROCHLORIDE, 2; 30; 10 MG/5ML; MG/5ML; MG/5ML
30-2-10 SYRUP ORAL
Qty: 200 | Refills: 0 | Status: DISCONTINUED | COMMUNITY
Start: 2021-12-27 | End: 2022-09-23

## 2022-09-23 RX ORDER — MELOXICAM 15 MG/1
15 TABLET ORAL
Qty: 30 | Refills: 0 | Status: DISCONTINUED | COMMUNITY
Start: 2022-04-07 | End: 2022-09-23

## 2022-09-23 RX ORDER — AZITHROMYCIN 250 MG/1
250 TABLET, FILM COATED ORAL
Qty: 1 | Refills: 0 | Status: DISCONTINUED | COMMUNITY
Start: 2022-06-03 | End: 2022-09-23

## 2022-09-23 NOTE — PLAN
[FreeTextEntry1] : Will follow up labwork drawn in office today.\par \par Had breast imaging done earlier this year.\par \par Will adjust meds based on labs. \par Patient expressed understanding of plan.\par

## 2022-09-23 NOTE — REVIEW OF SYSTEMS
[Negative] : Genitourinary [Headache] : no headache [Dizziness] : no dizziness [de-identified] : Rare anxiety

## 2022-09-23 NOTE — HISTORY OF PRESENT ILLNESS
[FreeTextEntry1] : Here for follow-up; needs med refills.\par  [de-identified] : Here for follow-up; needs med refills. Needs BP check.\par \par Parathyroid surgery 2021. Saw endocrine this summer.\par \par Taking Vitamin D3 1000IU \par Exercising more.  \par Feeling well today.\par Declines flu vaccine.

## 2022-09-26 LAB
25(OH)D3 SERPL-MCNC: 42.2 NG/ML
ALBUMIN SERPL ELPH-MCNC: 4.7 G/DL
ALP BLD-CCNC: 68 U/L
ALT SERPL-CCNC: 17 U/L
ANION GAP SERPL CALC-SCNC: 13 MMOL/L
AST SERPL-CCNC: 15 U/L
BASOPHILS # BLD AUTO: 0.06 K/UL
BASOPHILS NFR BLD AUTO: 0.9 %
BILIRUB SERPL-MCNC: 1.1 MG/DL
BUN SERPL-MCNC: 13 MG/DL
CALCIUM SERPL-MCNC: 9.5 MG/DL
CHLORIDE SERPL-SCNC: 102 MMOL/L
CHOLEST SERPL-MCNC: 206 MG/DL
CO2 SERPL-SCNC: 25 MMOL/L
CREAT SERPL-MCNC: 0.73 MG/DL
EGFR: 97 ML/MIN/1.73M2
EOSINOPHIL # BLD AUTO: 0.13 K/UL
EOSINOPHIL NFR BLD AUTO: 1.9 %
ESTIMATED AVERAGE GLUCOSE: 111 MG/DL
GLUCOSE SERPL-MCNC: 91 MG/DL
HBA1C MFR BLD HPLC: 5.5 %
HCT VFR BLD CALC: 40.3 %
HDLC SERPL-MCNC: 49 MG/DL
HGB BLD-MCNC: 13.5 G/DL
IMM GRANULOCYTES NFR BLD AUTO: 0.3 %
LDLC SERPL CALC-MCNC: 141 MG/DL
LYMPHOCYTES # BLD AUTO: 2.42 K/UL
LYMPHOCYTES NFR BLD AUTO: 35.1 %
MAN DIFF?: NORMAL
MCHC RBC-ENTMCNC: 31.5 PG
MCHC RBC-ENTMCNC: 33.5 GM/DL
MCV RBC AUTO: 93.9 FL
MONOCYTES # BLD AUTO: 0.56 K/UL
MONOCYTES NFR BLD AUTO: 8.1 %
NEUTROPHILS # BLD AUTO: 3.71 K/UL
NEUTROPHILS NFR BLD AUTO: 53.7 %
NONHDLC SERPL-MCNC: 157 MG/DL
PLATELET # BLD AUTO: 259 K/UL
POTASSIUM SERPL-SCNC: 4.3 MMOL/L
PROT SERPL-MCNC: 7.5 G/DL
RBC # BLD: 4.29 M/UL
RBC # FLD: 12.1 %
SODIUM SERPL-SCNC: 140 MMOL/L
TRIGL SERPL-MCNC: 80 MG/DL
WBC # FLD AUTO: 6.9 K/UL

## 2022-11-02 LAB
25(OH)D3 SERPL-MCNC: 40.5 NG/ML
CALCIUM SERPL-MCNC: 9.8 MG/DL
PARATHYROID HORMONE INTACT: 35 PG/ML
T4 FREE SERPL-MCNC: 1.6 NG/DL
T4 SERPL-MCNC: 8.7 UG/DL
TSH SERPL-ACNC: 1.72 UIU/ML

## 2022-11-14 ENCOUNTER — APPOINTMENT (OUTPATIENT)
Dept: ORTHOPEDIC SURGERY | Facility: CLINIC | Age: 57
End: 2022-11-14
Payer: COMMERCIAL

## 2022-11-14 VITALS — HEIGHT: 67 IN | BODY MASS INDEX: 26.84 KG/M2 | WEIGHT: 171 LBS

## 2022-11-14 PROCEDURE — 72040 X-RAY EXAM NECK SPINE 2-3 VW: CPT

## 2022-11-14 PROCEDURE — 99213 OFFICE O/P EST LOW 20 MIN: CPT | Mod: 25

## 2022-11-14 PROCEDURE — 99203 OFFICE O/P NEW LOW 30 MIN: CPT | Mod: 25

## 2022-11-14 PROCEDURE — 96372 THER/PROPH/DIAG INJ SC/IM: CPT

## 2022-11-14 RX ORDER — METHYLPREDNISOLONE 4 MG/1
4 TABLET ORAL
Qty: 1 | Refills: 0 | Status: ACTIVE | COMMUNITY
Start: 2022-11-14 | End: 1900-01-01

## 2022-11-14 NOTE — HISTORY OF PRESENT ILLNESS
[Neck] : neck [10] : 10 [Dull/Aching] : dull/aching [Localized] : localized [Sharp] : sharp [Full time] : Work status: full time [de-identified] : 11/14/22: Patient is a 56 yo female c/o neck pain which radiates to right arm for 4 days with no specific injury. No n/t. Taking advil and tylenol as needed for pain. Pain is worse with movement. No previous injuries or surgeries to neck.  [] : Post Surgical Visit: no [FreeTextEntry1] : Rt shoulder  [FreeTextEntry3] : 11/11/22 [FreeTextEntry5] : Patient complains of neck and shoulder pain after doing some cleaning around the house on 11/11/22 [de-identified] : movement

## 2022-11-14 NOTE — PROCEDURE
[Therapeutic Injection] : therapeutic injection [Right] : of the right [Trapezius muscle] : trapezius muscle [Pain] : pain [Inflammation] : inflammation [Alcohol] : alcohol [Betadine] : betadine [Ethyl Chloride sprayed topically] : ethyl chloride sprayed topically [Sterile technique used] : sterile technique used [___ cc    1%] : Lidocaine ~Vcc of 1%  [___ cc    30mg] : Ketorolac (Toradol) ~Vcc of 30 mg  [Call if redness, pain or fever occur] : call if redness, pain or fever occur [Apply ice for 15min out of every hour for the next 12-24 hours as tolerated] : apply ice for 15 minutes out of every hour for the next 12-24 hours as tolerated [Patient was advised to rest the joint(s) for ____ days] : patient was advised to rest the joint(s) for [unfilled] days [Previous OTC use and PT nontherapeutic] : patient has tried OTC's including aspirin, Ibuprofen, Aleve, etc or prescription NSAIDS, and/or exercises at home and/or physical therapy without satisfactory response [Patient had decreased mobility in the joint] : patient had decreased mobility in the joint [Risks, benefits, alternatives discussed / Verbal consent obtained] : the risks benefits, and alternatives have been discussed, and verbal consent was obtained

## 2022-11-14 NOTE — ASSESSMENT
[FreeTextEntry1] : Xrays reviewed with patient\par Treatment options discussed\par MDP sent for pain and inflammation, will start tomorrow\par Toradol injection done today, tolerated well\par Flexeril sent for spasm \par Recommend course of PT/HEP\par Follow up in 4 weeks with spine specialist

## 2022-11-14 NOTE — PHYSICAL EXAM
[] : positive Spurling [Disc space narrowing] : Disc space narrowing [FreeTextEntry1] : osteophyte C4

## 2022-11-15 ENCOUNTER — APPOINTMENT (OUTPATIENT)
Dept: ORTHOPEDIC SURGERY | Facility: CLINIC | Age: 57
End: 2022-11-15

## 2022-12-04 ENCOUNTER — APPOINTMENT (OUTPATIENT)
Dept: FAMILY MEDICINE | Facility: CLINIC | Age: 57
End: 2022-12-04

## 2022-12-12 ENCOUNTER — APPOINTMENT (OUTPATIENT)
Dept: ORTHOPEDIC SURGERY | Facility: CLINIC | Age: 57
End: 2022-12-12

## 2022-12-15 ENCOUNTER — APPOINTMENT (OUTPATIENT)
Dept: ORTHOPEDIC SURGERY | Facility: CLINIC | Age: 57
End: 2022-12-15

## 2022-12-27 ENCOUNTER — APPOINTMENT (OUTPATIENT)
Dept: FAMILY MEDICINE | Facility: CLINIC | Age: 57
End: 2022-12-27

## 2023-01-13 ENCOUNTER — APPOINTMENT (OUTPATIENT)
Dept: ORTHOPEDIC SURGERY | Facility: CLINIC | Age: 58
End: 2023-01-13
Payer: COMMERCIAL

## 2023-01-13 VITALS — HEIGHT: 67 IN | WEIGHT: 171 LBS | BODY MASS INDEX: 26.84 KG/M2

## 2023-01-13 PROCEDURE — 99214 OFFICE O/P EST MOD 30 MIN: CPT

## 2023-01-13 PROCEDURE — 72170 X-RAY EXAM OF PELVIS: CPT

## 2023-01-13 PROCEDURE — 72110 X-RAY EXAM L-2 SPINE 4/>VWS: CPT

## 2023-01-13 RX ORDER — GABAPENTIN 100 MG/1
100 CAPSULE ORAL
Qty: 60 | Refills: 0 | Status: ACTIVE | COMMUNITY
Start: 2023-01-13 | End: 1900-01-01

## 2023-01-13 NOTE — HISTORY OF PRESENT ILLNESS
[Neck] : neck [7] : 7 [Dull/Aching] : dull/aching [Occasional] : occasional [Nothing helps with pain getting better] : Nothing helps with pain getting better [Sitting] : sitting [de-identified] : Pt seen by non-spine partners in the past\par \par 01/13/2023 ANAKENANJAMES is a 57 year old female presenting with neck and upper back pain that started in 11/2022 after doing chores around the house. Occasionally radiates down right arm. Had cortisone inj and a MDP 11/2022 at a [facility] visit; both with good relief. . Also here for low back pain, hx of  lumbar fusion about 9 years ago, denies pain radiating to the LEs. Denies n/t or b/b dysfunction [] : no [FreeTextEntry7] : right arm [de-identified] : driving [de-identified] : xray

## 2023-01-13 NOTE — ASSESSMENT
[FreeTextEntry1] : Persistent cervical radicular symptoms, Patient has failed 6 weeks of conservative care, including physical therapy and medications. Will obtain Cervical MRI to rule out HNP; will also be used to guide potential future injections/surgical management. \par physical therapy and medication \par rtc after MRI \par \par NSAIDs- Patient warned of risk of medication to GI tract, increased blood pressure, cardiac risk, and risk of fluid retention.  Advised to clear medication with internist or PCP if any concurrent health problem with heart, blood pressure, or GI system exists.\par Gabapentin- Patient advised of sedating effects, instructed not to drive, operate machinery, or take with other sedating medications. Advised of need to taper on/off medication and risk of abruptly stopping gabapentin.\par \par Entered by Noreen Nguyen acting as scribe.\par Dr. Freire-\par The documentation recorded by the scribe accurately reflects the service I personally performed and the decisions made by me.\par \par \par

## 2023-01-13 NOTE — IMAGING
[de-identified] : CSPINE\par Inspection: No rash or ecchymosis\par Palpation: No spasm in traps, rhomboids, paracervicals\par ROM: Full with no pain\par Strength: 5/5 bilateral deltoid, biceps, triceps, wrist flexors, wrist extensors, , abductors\par Sensation: Sensation present to light touch bilateral C5-T1 distributions\par Reflexes: Negative Hansen's bilaterally\par \par Bilateral Shoulders-\par Palpation: No tenderness to palpation\par ROM: Full with no pain\par Strength: Decent strength with rotator cuff testing\par Provocative maneuvers: Negative impingement testing\par \par \par LSPINE\par Inspection: No rash or ecchymosis. Healed inscision \par Palpation: No tenderness to palpation or spasm in bilateral thoracic and lumbar paraspinal musculature, b/l SI joint tenderness to palpation\par ROM: Full with no pain\par Strength: 5/5 bilateral hip flexors, knee extensors, ankle dorsiflexors, EHL, ankle plantarflexors\par Sensation: Sensation present to light touch bilateral L2-S1 distributions\par Provocative maneuvers: Negative bilateral straight leg raise\par \par Bilateral hips-\par Palpation: LEFT >RIGHT tenderness to palpation over greater trochanter or IT band\par ROM: No pain with flexion and internal rotation\par \par  [Straightening consistent with spasm] : Straightening consistent with spasm [Disc space narrowing] : Disc space narrowing [No loosening of hardware] : No loosening of hardware [There are no fractures, subluxations or dislocations. No significant abnormalities are seen] : There are no fractures, subluxations or dislocations. No significant abnormalities are seen

## 2023-03-07 ENCOUNTER — APPOINTMENT (OUTPATIENT)
Dept: FAMILY MEDICINE | Facility: CLINIC | Age: 58
End: 2023-03-07
Payer: COMMERCIAL

## 2023-03-07 VITALS
HEART RATE: 79 BPM | TEMPERATURE: 97.8 F | SYSTOLIC BLOOD PRESSURE: 122 MMHG | WEIGHT: 171 LBS | OXYGEN SATURATION: 97 % | DIASTOLIC BLOOD PRESSURE: 80 MMHG | BODY MASS INDEX: 26.84 KG/M2 | HEIGHT: 67 IN

## 2023-03-07 DIAGNOSIS — Z00.00 ENCOUNTER FOR GENERAL ADULT MEDICAL EXAMINATION W/OUT ABNORMAL FINDINGS: ICD-10-CM

## 2023-03-07 PROCEDURE — 99396 PREV VISIT EST AGE 40-64: CPT | Mod: 25

## 2023-03-07 PROCEDURE — 36415 COLL VENOUS BLD VENIPUNCTURE: CPT

## 2023-03-08 LAB
25(OH)D3 SERPL-MCNC: 33.9 NG/ML
ALBUMIN SERPL ELPH-MCNC: 4.5 G/DL
ALP BLD-CCNC: 80 U/L
ALT SERPL-CCNC: 34 U/L
ANION GAP SERPL CALC-SCNC: 11 MMOL/L
APPEARANCE: CLEAR
AST SERPL-CCNC: 21 U/L
BACTERIA: NEGATIVE
BASOPHILS # BLD AUTO: 0.06 K/UL
BASOPHILS NFR BLD AUTO: 0.7 %
BILIRUB SERPL-MCNC: 1 MG/DL
BILIRUBIN URINE: NEGATIVE
BLOOD URINE: NEGATIVE
BUN SERPL-MCNC: 14 MG/DL
CALCIUM SERPL-MCNC: 9.5 MG/DL
CHLORIDE SERPL-SCNC: 100 MMOL/L
CHOLEST SERPL-MCNC: 219 MG/DL
CO2 SERPL-SCNC: 26 MMOL/L
COLOR: COLORLESS
CREAT SERPL-MCNC: 0.69 MG/DL
EGFR: 102 ML/MIN/1.73M2
EOSINOPHIL # BLD AUTO: 0.2 K/UL
EOSINOPHIL NFR BLD AUTO: 2.4 %
ESTIMATED AVERAGE GLUCOSE: 117 MG/DL
GLUCOSE QUALITATIVE U: NEGATIVE
GLUCOSE SERPL-MCNC: 90 MG/DL
HBA1C MFR BLD HPLC: 5.7 %
HCT VFR BLD CALC: 39.8 %
HDLC SERPL-MCNC: 52 MG/DL
HGB BLD-MCNC: 12.9 G/DL
HYALINE CASTS: 0 /LPF
IMM GRANULOCYTES NFR BLD AUTO: 0.2 %
KETONES URINE: NEGATIVE
LDLC SERPL CALC-MCNC: 147 MG/DL
LEUKOCYTE ESTERASE URINE: NEGATIVE
LYMPHOCYTES # BLD AUTO: 2.75 K/UL
LYMPHOCYTES NFR BLD AUTO: 33.3 %
MAN DIFF?: NORMAL
MCHC RBC-ENTMCNC: 31.2 PG
MCHC RBC-ENTMCNC: 32.4 GM/DL
MCV RBC AUTO: 96.4 FL
MICROSCOPIC-UA: NORMAL
MONOCYTES # BLD AUTO: 0.67 K/UL
MONOCYTES NFR BLD AUTO: 8.1 %
NEUTROPHILS # BLD AUTO: 4.55 K/UL
NEUTROPHILS NFR BLD AUTO: 55.3 %
NITRITE URINE: NEGATIVE
NONHDLC SERPL-MCNC: 167 MG/DL
PH URINE: 6
PLATELET # BLD AUTO: 275 K/UL
POTASSIUM SERPL-SCNC: 4.5 MMOL/L
PROT SERPL-MCNC: 7 G/DL
PROTEIN URINE: NEGATIVE
RBC # BLD: 4.13 M/UL
RBC # FLD: 12.4 %
RED BLOOD CELLS URINE: 0 /HPF
SODIUM SERPL-SCNC: 138 MMOL/L
SPECIFIC GRAVITY URINE: 1.01
SQUAMOUS EPITHELIAL CELLS: 0 /HPF
TRIGL SERPL-MCNC: 100 MG/DL
TSH SERPL-ACNC: 1.04 UIU/ML
UROBILINOGEN URINE: NORMAL
WBC # FLD AUTO: 8.25 K/UL
WHITE BLOOD CELLS URINE: 0 /HPF

## 2023-04-05 ENCOUNTER — APPOINTMENT (OUTPATIENT)
Dept: ORTHOPEDIC SURGERY | Facility: CLINIC | Age: 58
End: 2023-04-05
Payer: COMMERCIAL

## 2023-04-05 PROCEDURE — 99215 OFFICE O/P EST HI 40 MIN: CPT

## 2023-04-05 NOTE — IMAGING
[Straightening consistent with spasm] : Straightening consistent with spasm [Disc space narrowing] : Disc space narrowing [No loosening of hardware] : No loosening of hardware [There are no fractures, subluxations or dislocations. No significant abnormalities are seen] : There are no fractures, subluxations or dislocations. No significant abnormalities are seen [de-identified] : CSPINE\par Inspection: No rash or ecchymosis\par Palpation: No spasm in traps, rhomboids, paracervicals\par ROM: Full with no pain\par Strength: 5/5 bilateral deltoid, biceps, triceps, wrist flexors, wrist extensors, , abductors\par Sensation: Sensation present to light touch bilateral C5-T1 distributions\par Reflexes: Negative Hansen's bilaterally\par \par Bilateral Shoulders-\par Palpation: No tenderness to palpation\par ROM: Full with no pain\par Strength: Decent strength with rotator cuff testing\par Provocative maneuvers: Negative impingement testing\par \par \par LSPINE\par Inspection: No rash or ecchymosis. Healed inscision \par Palpation: No tenderness to palpation or spasm in bilateral thoracic and lumbar paraspinal musculature, b/l SI joint tenderness to palpation\par ROM: Full with no pain\par Strength: 5/5 bilateral hip flexors, knee extensors, ankle dorsiflexors, EHL, ankle plantarflexors\par Sensation: Sensation present to light touch bilateral L2-S1 distributions\par Provocative maneuvers: Negative bilateral straight leg raise\par \par Bilateral hips-\par Palpation: LEFT >RIGHT tenderness to palpation over greater trochanter or IT band\par ROM: No pain with flexion and internal rotation\par \par

## 2023-04-05 NOTE — ASSESSMENT
[FreeTextEntry1] : physical therapy and medication \par \par Bulge C4/5 with R NF narrowing;\par C5/6 HNP with abutment of cord and mild L NF stenosis;\par C6/7 R HNP with NF stenosis\par \par NSAIDs- Patient warned of risk of medication to GI tract, increased blood pressure, cardiac risk, and risk of fluid retention.  Advised to clear medication with internist or PCP if any concurrent health problem with heart, blood pressure, or GI system exists.\par \par Gabapentin- Patient advised of sedating effects, instructed not to drive, operate machinery, or take with other sedating medications. Advised of need to taper on/off medication and risk of abruptly stopping gabapentin.\par \par \par
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
## 2023-04-07 NOTE — ASU PATIENT PROFILE, ADULT - PAIN CHRONIC, PROFILE
Prior to immunization administration, verified patients identity using patient s name and date of birth. Please see Immunization Activity for additional information.     Screening Questionnaire for Adult Immunization    Are you sick today?   No   Do you have allergies to medications, food, a vaccine component or latex?   No   Have you ever had a serious reaction after receiving a vaccination?   No   Do you have a long-term health problem with heart, lung, kidney, or metabolic disease (e.g., diabetes), asthma, a blood disorder, no spleen, complement component deficiency, a cochlear implant, or a spinal fluid leak?  Are you on long-term aspirin therapy?   No   Do you have cancer, leukemia, HIV/AIDS, or any other immune system problem?   No   Do you have a parent, brother, or sister with an immune system problem?   No   In the past 3 months, have you taken medications that affect  your immune system, such as prednisone, other steroids, or anticancer drugs; drugs for the treatment of rheumatoid arthritis, Crohn s disease, or psoriasis; or have you had radiation treatments?   No   Have you had a seizure, or a brain or other nervous system problem?   Seizure 30 years ago   During the past year, have you received a transfusion of blood or blood    products, or been given immune (gamma) globulin or antiviral drug?   No   For women: Are you pregnant or is there a chance you could become       pregnant during the next month?   No   Have you received any vaccinations in the past 4 weeks?   No     Immunization questionnaire was positive for at least one answer.  Notified Dr. Belcher.      Injection of Hep B given by Niki Haider MA. Patient instructed to remain in clinic for 15 minutes afterwards, and to report any adverse reactions.     Screening performed by Niki Haider MA on 4/7/2023 at 5:02 PM.         yes

## 2023-04-28 ENCOUNTER — APPOINTMENT (OUTPATIENT)
Dept: ORTHOPEDIC SURGERY | Facility: CLINIC | Age: 58
End: 2023-04-28
Payer: OTHER MISCELLANEOUS

## 2023-04-28 ENCOUNTER — APPOINTMENT (OUTPATIENT)
Dept: ORTHOPEDIC SURGERY | Facility: CLINIC | Age: 58
End: 2023-04-28

## 2023-04-28 VITALS — BODY MASS INDEX: 26.84 KG/M2 | WEIGHT: 171 LBS | HEIGHT: 67 IN

## 2023-04-28 DIAGNOSIS — Z98.1 ARTHRODESIS STATUS: ICD-10-CM

## 2023-04-28 PROCEDURE — 72170 X-RAY EXAM OF PELVIS: CPT

## 2023-04-28 PROCEDURE — 72100 X-RAY EXAM L-S SPINE 2/3 VWS: CPT

## 2023-04-28 PROCEDURE — 99215 OFFICE O/P EST HI 40 MIN: CPT
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
## 2023-04-28 NOTE — WORK
[Sprain/Strain] : sprain/strain [Was the competent medical cause of the injury] : was the competent medical cause of the injury [Are consistent with the injury] : are consistent with the injury [Consistent with my objective findings] : consistent with my objective findings [Partial] : partial [Reveals pre-existing condition(s) that may affect treatment/prognosis] : reveals pre-existing condition(s) that may affect treatment/prognosis [Can return to work without limitations on ______] : can return to work without limitations on [unfilled] [I provided the services listed above] :  I provided the services listed above. [FreeTextEntry2] : Prior lumbar fusion and cervical radicular pain

## 2023-04-28 NOTE — IMAGING
[Straightening consistent with spasm] : Straightening consistent with spasm [There are no fractures, subluxations or dislocations. No significant abnormalities are seen] : There are no fractures, subluxations or dislocations. No significant abnormalities are seen [de-identified] : CSPINE\par Inspection: No rash or ecchymosis\par Palpation: No spasm in traps, rhomboids, paracervicals\par ROM: Full with no pain\par Strength: 5/5 bilateral deltoid, biceps, triceps, wrist flexors, wrist extensors, , abductors\par Sensation: Sensation present to light touch bilateral C5-T1 distributions\par Reflexes: Negative Hansen's bilaterally\par \par Bilateral Shoulders-\par Palpation: No tenderness to palpation\par ROM: Full with no pain\par Strength: Decent strength with rotator cuff testing\par Provocative maneuvers: Negative impingement testing\par \par LSPINE\par Inspection: No rash or ecchymosis. Healed incision \par Palpation: No tenderness to palpation or spasm in bilateral thoracic and lumbar paraspinal musculature, b/l SI joint tenderness to palpation\par ROM: Full with stiffness\par Strength: 5/5 bilateral hip flexors, knee extensors, ankle dorsiflexors, EHL, ankle plantarflexors\par Sensation: Sensation present to light touch bilateral L2-S1 distributions\par Provocative maneuvers: Negative bilateral straight leg raise\par \par Bilateral hips-\par Palpation: LEFT >RIGHT tenderness to palpation over greater trochanter or IT band\par ROM: No pain with flexion and internal rotation\par \par  [Disc space narrowing] : Disc space narrowing [Implants in position] : Implants in position [No loosening of hardware] : No loosening of hardware

## 2023-04-30 ENCOUNTER — APPOINTMENT (OUTPATIENT)
Dept: FAMILY MEDICINE | Facility: CLINIC | Age: 58
End: 2023-04-30

## 2023-05-03 ENCOUNTER — APPOINTMENT (OUTPATIENT)
Dept: ORTHOPEDIC SURGERY | Facility: CLINIC | Age: 58
End: 2023-05-03

## 2023-05-16 ENCOUNTER — APPOINTMENT (OUTPATIENT)
Dept: ENDOCRINOLOGY | Facility: CLINIC | Age: 58
End: 2023-05-16

## 2023-06-23 ENCOUNTER — APPOINTMENT (OUTPATIENT)
Dept: ORTHOPEDIC SURGERY | Facility: CLINIC | Age: 58
End: 2023-06-23

## 2023-09-05 ENCOUNTER — APPOINTMENT (OUTPATIENT)
Dept: FAMILY MEDICINE | Facility: CLINIC | Age: 58
End: 2023-09-05
Payer: COMMERCIAL

## 2023-09-05 VITALS
HEART RATE: 75 BPM | HEIGHT: 67 IN | DIASTOLIC BLOOD PRESSURE: 87 MMHG | SYSTOLIC BLOOD PRESSURE: 129 MMHG | OXYGEN SATURATION: 99 % | TEMPERATURE: 98 F

## 2023-09-05 VITALS — WEIGHT: 171 LBS | BODY MASS INDEX: 26.78 KG/M2

## 2023-09-05 DIAGNOSIS — J30.9 ALLERGIC RHINITIS, UNSPECIFIED: ICD-10-CM

## 2023-09-05 PROCEDURE — 99214 OFFICE O/P EST MOD 30 MIN: CPT | Mod: 25

## 2023-09-05 PROCEDURE — 36415 COLL VENOUS BLD VENIPUNCTURE: CPT

## 2023-09-05 RX ORDER — CYCLOBENZAPRINE HYDROCHLORIDE 5 MG/1
5 TABLET, FILM COATED ORAL
Qty: 20 | Refills: 0 | Status: DISCONTINUED | COMMUNITY
Start: 2022-11-14 | End: 2023-09-05

## 2023-09-05 RX ORDER — FLUTICASONE PROPIONATE 50 UG/1
50 SPRAY, METERED NASAL
Qty: 48 | Refills: 3 | Status: ACTIVE | COMMUNITY
Start: 2017-06-03 | End: 1900-01-01

## 2023-09-05 RX ORDER — LEVOTHYROXINE SODIUM 0.07 MG/1
75 TABLET ORAL
Qty: 90 | Refills: 1 | Status: ACTIVE | COMMUNITY
Start: 2020-05-01 | End: 1900-01-01

## 2023-09-06 LAB
ALBUMIN SERPL ELPH-MCNC: 4.8 G/DL
ALP BLD-CCNC: 75 U/L
ALT SERPL-CCNC: 24 U/L
ANION GAP SERPL CALC-SCNC: 11 MMOL/L
AST SERPL-CCNC: 18 U/L
BASOPHILS # BLD AUTO: 0.06 K/UL
BASOPHILS NFR BLD AUTO: 0.8 %
BILIRUB SERPL-MCNC: 0.8 MG/DL
BUN SERPL-MCNC: 14 MG/DL
CALCIUM SERPL-MCNC: 9.4 MG/DL
CHLORIDE SERPL-SCNC: 104 MMOL/L
CHOLEST SERPL-MCNC: 224 MG/DL
CO2 SERPL-SCNC: 25 MMOL/L
CREAT SERPL-MCNC: 0.71 MG/DL
EGFR: 100 ML/MIN/1.73M2
EOSINOPHIL # BLD AUTO: 0.2 K/UL
EOSINOPHIL NFR BLD AUTO: 2.8 %
GLUCOSE SERPL-MCNC: 96 MG/DL
HCT VFR BLD CALC: 38.4 %
HDLC SERPL-MCNC: 57 MG/DL
HGB BLD-MCNC: 12.7 G/DL
IMM GRANULOCYTES NFR BLD AUTO: 0.1 %
LDLC SERPL CALC-MCNC: 153 MG/DL
LYMPHOCYTES # BLD AUTO: 2.76 K/UL
LYMPHOCYTES NFR BLD AUTO: 38.3 %
MAN DIFF?: NORMAL
MCHC RBC-ENTMCNC: 30.8 PG
MCHC RBC-ENTMCNC: 33.1 GM/DL
MCV RBC AUTO: 93.2 FL
MONOCYTES # BLD AUTO: 0.7 K/UL
MONOCYTES NFR BLD AUTO: 9.7 %
NEUTROPHILS # BLD AUTO: 3.47 K/UL
NEUTROPHILS NFR BLD AUTO: 48.3 %
NONHDLC SERPL-MCNC: 168 MG/DL
PLATELET # BLD AUTO: 253 K/UL
POTASSIUM SERPL-SCNC: 4.3 MMOL/L
PROT SERPL-MCNC: 7.3 G/DL
RBC # BLD: 4.12 M/UL
RBC # FLD: 12.6 %
SODIUM SERPL-SCNC: 141 MMOL/L
TRIGL SERPL-MCNC: 83 MG/DL
TSH SERPL-ACNC: 0.05 UIU/ML
WBC # FLD AUTO: 7.2 K/UL

## 2023-10-29 ENCOUNTER — LABORATORY RESULT (OUTPATIENT)
Age: 58
End: 2023-10-29

## 2023-10-29 ENCOUNTER — APPOINTMENT (OUTPATIENT)
Dept: FAMILY MEDICINE | Facility: CLINIC | Age: 58
End: 2023-10-29
Payer: COMMERCIAL

## 2023-10-29 VITALS
BODY MASS INDEX: 26.53 KG/M2 | DIASTOLIC BLOOD PRESSURE: 77 MMHG | OXYGEN SATURATION: 97 % | TEMPERATURE: 97.8 F | RESPIRATION RATE: 16 BRPM | WEIGHT: 169 LBS | HEIGHT: 67 IN | SYSTOLIC BLOOD PRESSURE: 138 MMHG | HEART RATE: 84 BPM

## 2023-10-29 DIAGNOSIS — E78.00 PURE HYPERCHOLESTEROLEMIA, UNSPECIFIED: ICD-10-CM

## 2023-10-29 PROCEDURE — 36415 COLL VENOUS BLD VENIPUNCTURE: CPT

## 2023-10-29 PROCEDURE — 99214 OFFICE O/P EST MOD 30 MIN: CPT | Mod: 25

## 2023-10-30 LAB
CHOLEST SERPL-MCNC: 221 MG/DL
HDLC SERPL-MCNC: 58 MG/DL
LDLC SERPL CALC-MCNC: 150 MG/DL
NONHDLC SERPL-MCNC: 162 MG/DL
TRIGL SERPL-MCNC: 70 MG/DL
TSH SERPL-ACNC: 0.07 UIU/ML

## 2023-11-20 ENCOUNTER — NON-APPOINTMENT (OUTPATIENT)
Age: 58
End: 2023-11-20

## 2023-12-01 ENCOUNTER — APPOINTMENT (OUTPATIENT)
Dept: ORTHOPEDIC SURGERY | Facility: CLINIC | Age: 58
End: 2023-12-01
Payer: OTHER MISCELLANEOUS

## 2023-12-01 VITALS — HEIGHT: 67 IN | BODY MASS INDEX: 26.53 KG/M2 | WEIGHT: 169 LBS

## 2023-12-01 PROCEDURE — 20552 NJX 1/MLT TRIGGER POINT 1/2: CPT

## 2023-12-01 PROCEDURE — 99213 OFFICE O/P EST LOW 20 MIN: CPT | Mod: 25

## 2023-12-21 ENCOUNTER — APPOINTMENT (OUTPATIENT)
Dept: ENDOCRINOLOGY | Facility: CLINIC | Age: 58
End: 2023-12-21
Payer: COMMERCIAL

## 2023-12-21 VITALS
SYSTOLIC BLOOD PRESSURE: 132 MMHG | WEIGHT: 174.38 LBS | DIASTOLIC BLOOD PRESSURE: 83 MMHG | OXYGEN SATURATION: 97 % | HEIGHT: 67 IN | HEART RATE: 79 BPM | BODY MASS INDEX: 27.37 KG/M2

## 2023-12-21 DIAGNOSIS — E83.52 HYPERCALCEMIA: ICD-10-CM

## 2023-12-21 PROCEDURE — 99213 OFFICE O/P EST LOW 20 MIN: CPT

## 2023-12-21 NOTE — ASSESSMENT
[Carbohydrate Consistent Diet] : carbohydrate consistent diet [Levothyroxine] : The patient was instructed to take Levothyroxine on an empty stomach, separate from vitamins, and wait at least 30 minutes before eating [FreeTextEntry1] : Patient with history of hyperparathyroidism she is a few years post parathyroidectomy. A few days after surgery had numbness on left side of face and body and was told to take extra TUMS and it resolved. Has remained asx off calcium.  She has been hypothyroid for over 5 years. TFT have been stable. Repeated labs today.  f/u 3 mos. BD showed increase loss L 1 T score - 1.7, has h/o spinal fusion. thyroid nodule stable. Can repeat.

## 2023-12-21 NOTE — HISTORY OF PRESENT ILLNESS
[FreeTextEntry1] : Patient was referred here for evaluation of hypercalcemia due to hyperparathyroidism. Had left  superior parathyroid removed 9/2/2021. She has no history of kidney stones and was told that her bone density was good.  She is not taking any calcium supplements or vitamin D presently. . She did go through an early menopause age 46 most likely due to receiving chemotherapy for treatment of breast cancer. She's not on any continuous medication for this. She was started on hydrochlorothiazide for hypertension in April but her calcium was elevated prior to this. She also has a history of hypothyroidism currently on thyroid hormone replacement but believes she originally presented with an overactive thyroid. She did not receive radioactive iodine or thyroid surgery. She does have a history of a thyroid nodule on the sonogram. There is a family history of thyroid disease both her mom and sister and her mother recently had 3 parathyroid glands removed for management of hyperparathyroidism. Levothyroxine was decreased to 50 ug OD 4 weeks ago. Generally feels well. despite having Covid vaccine and booster and Covid. mild case Rodo 2021.

## 2023-12-21 NOTE — PHYSICAL EXAM
[Alert] : alert [No Acute Distress] : no acute distress [Normal Voice/Communication] : normal voice communication [Normal Sclera/Conjunctiva] : normal sclera/conjunctiva [EOMI] : extra ocular movement intact [Normal Outer Ear/Nose] : the ears and nose were normal in appearance [No Neck Mass] : no neck mass was observed [No Thyroid Nodules] : no palpable thyroid nodules [Well Healed Scar] : well healed scar [No Respiratory Distress] : no respiratory distress [Clear to Auscultation] : lungs were clear to auscultation bilaterally [Normal PMI] : the apical impulse was normal [Normal Rate] : heart rate was normal [No Edema] : no peripheral edema [Normal Bowel Sounds] : normal bowel sounds [Soft] : abdomen soft [No CVA Tenderness] : no ~M costovertebral angle tenderness [No Spinal Tenderness] : no spinal tenderness [Normal Gait] : normal gait [No Joint Swelling] : no joint swelling seen [Normal Reflexes] : deep tendon reflexes were 2+ and symmetric [No Tremors] : no tremors [Oriented x3] : oriented to person, place, and time [Normal Insight/Judgement] : insight and judgment were intact [de-identified] : negative [place] sign

## 2024-01-12 ENCOUNTER — APPOINTMENT (OUTPATIENT)
Dept: ORTHOPEDIC SURGERY | Facility: CLINIC | Age: 59
End: 2024-01-12
Payer: OTHER MISCELLANEOUS

## 2024-01-12 DIAGNOSIS — Z98.1 ARTHRODESIS STATUS: ICD-10-CM

## 2024-01-12 DIAGNOSIS — M70.62 TROCHANTERIC BURSITIS, LEFT HIP: ICD-10-CM

## 2024-01-12 DIAGNOSIS — M53.3 SACROCOCCYGEAL DISORDERS, NOT ELSEWHERE CLASSIFIED: ICD-10-CM

## 2024-01-12 DIAGNOSIS — M70.61 TROCHANTERIC BURSITIS, RIGHT HIP: ICD-10-CM

## 2024-01-12 PROCEDURE — 20552 NJX 1/MLT TRIGGER POINT 1/2: CPT

## 2024-01-12 PROCEDURE — 99213 OFFICE O/P EST LOW 20 MIN: CPT | Mod: 25
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
## 2024-01-12 NOTE — IMAGING
[de-identified] : CSPINE Inspection: No rash or ecchymosis Palpation: No spasm in traps, rhomboids, paracervicals. L trapezial tenderness, L rhomboid tenderness ROM: Full with no pain Strength: 5/5 bilateral deltoid, biceps, triceps, wrist flexors, wrist extensors, , abductors Sensation: Sensation present to light touch bilateral C5-T1 distributions Reflexes: Negative Hansen's bilaterally  Bilateral Shoulders- Palpation: No tenderness to palpation ROM: Full with no pain Strength: Decent strength with rotator cuff testing Provocative maneuvers: Negative impingement testing  LSPINE Inspection: No rash or ecchymosis. Healed incision  Palpation: No tenderness to palpation or spasm in bilateral thoracic and lumbar paraspinal musculature, b/l SI joint tenderness to palpation ROM: Full with stiffness Strength: 5/5 bilateral hip flexors, knee extensors, ankle dorsiflexors, EHL, ankle plantarflexors Sensation: Sensation present to light touch bilateral L2-S1 distributions Provocative maneuvers: Negative bilateral straight leg raise  Bilateral hips- Palpation: LEFT >RIGHT tenderness to palpation over greater trochanter or IT band ROM: No pain with flexion and internal rotation

## 2024-01-12 NOTE — ASSESSMENT
[FreeTextEntry1] : physical therapy and medication  Bulge C4/5 with R NF narrowing;  C5/6 HNP with abutment of cord and mild L NF stenosis;  C6/7 R HNP with NF stenosis  NSAIDs- Patient warned of risk of medication to GI tract, increased blood pressure, cardiac risk, and risk of fluid retention. Advised to clear medication with internist or PCP if any concurrent health problem with heart, blood pressure, or GI system exists.  Gabapentin- Patient advised of sedating effects, instructed not to drive, operate machinery, or take with other sedating medications. Advised of need to taper on/off medication and risk of abruptly stopping gabapentin.  Trigger Point Injection- The risks, benefits, contents and alternatives to injection were explained in full to the patient.  Risks outlined include but are not limited to infection, bleeding, scarring, skin discoloration, temporary increase in pain, syncopal episode, failure to resolve symptoms, allergic reaction, flare reaction, permanent white skin discoloration, symptom recurrence, and elevation of blood sugar in diabetics.  Patient understood the risks.  All questions were answered.  After discussion of options, patient requested an injection.  Oral informed consent was obtained and sterile prep was done of the injection site.  Sterile technique was used to introduce the mixture. The mixture consisted of:  4 cc 1% lidocaine 80 mg of Depomedrol L rhomboid Patient tolerated the procedure well.  Patient advised to ice the injection site this evening.  Signs and symptoms of infection reviewed and patient advised to call immediately for redness, fevers, and/or chills.  Patient seen by Sophia Kc PA-C, with and under the supervision of  Dr. Alexander Freire M.D.

## 2024-02-23 ENCOUNTER — APPOINTMENT (OUTPATIENT)
Dept: ORTHOPEDIC SURGERY | Facility: CLINIC | Age: 59
End: 2024-02-23
Payer: OTHER MISCELLANEOUS

## 2024-02-23 ENCOUNTER — NON-APPOINTMENT (OUTPATIENT)
Age: 59
End: 2024-02-23

## 2024-02-23 PROCEDURE — 99214 OFFICE O/P EST MOD 30 MIN: CPT

## 2024-02-23 NOTE — IMAGING
[de-identified] : CSPINE Inspection: No rash or ecchymosis Palpation: No spasm in traps, rhomboids, paracervicals. L trapezial tenderness, L rhomboid tenderness ROM: Full with no pain Strength: 5/5 bilateral deltoid, biceps, triceps, wrist flexors, wrist extensors, , abductors Sensation: Sensation present to light touch bilateral C5-T1 distributions Reflexes: Negative Hansen's bilaterally  Bilateral Shoulders- Palpation: No tenderness to palpation ROM: Full with no pain Strength: Decent strength with rotator cuff testing Provocative maneuvers: Negative impingement testing  LSPINE Palpation: No tenderness to palpation or spasm in bilateral thoracic and lumbar paraspinal musculature, b/l SI joint tenderness to palpation ROM: Full with stiffness Strength: 5/5 bilateral hip flexors, knee extensors, ankle dorsiflexors, EHL, ankle plantarflexors Sensation: Sensation present to light touch bilateral L2-S1 distributions Provocative maneuvers: Negative bilateral straight leg raise  Bilateral hips- Palpation: LEFT >RIGHT tenderness to palpation over greater trochanter or IT band ROM: No pain with flexion and internal rotation

## 2024-02-23 NOTE — ASSESSMENT
[FreeTextEntry1] : physical therapy and medication Pain management c/s  Bulge C4/5 with R NF narrowing;  C5/6 HNP with abutment of cord and mild L NF stenosis;  C6/7 R HNP with NF stenosis  NSAIDs- Patient warned of risk of medication to GI tract, increased blood pressure, cardiac risk, and risk of fluid retention. Advised to clear medication with internist or PCP if any concurrent health problem with heart, blood pressure, or GI system exists.  Gabapentin- Patient advised of sedating effects, instructed not to drive, operate machinery, or take with other sedating medications. Advised of need to taper on/off medication and risk of abruptly stopping gabapentin.  Follow up 6 weeks
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
## 2024-02-26 ENCOUNTER — RX RENEWAL (OUTPATIENT)
Age: 59
End: 2024-02-26

## 2024-02-29 ENCOUNTER — APPOINTMENT (OUTPATIENT)
Dept: FAMILY MEDICINE | Facility: CLINIC | Age: 59
End: 2024-02-29
Payer: COMMERCIAL

## 2024-02-29 VITALS
OXYGEN SATURATION: 99 % | BODY MASS INDEX: 26.68 KG/M2 | RESPIRATION RATE: 15 BRPM | TEMPERATURE: 97.8 F | HEART RATE: 79 BPM | WEIGHT: 170 LBS | HEIGHT: 67 IN | DIASTOLIC BLOOD PRESSURE: 76 MMHG | SYSTOLIC BLOOD PRESSURE: 116 MMHG

## 2024-02-29 DIAGNOSIS — F41.9 ANXIETY DISORDER, UNSPECIFIED: ICD-10-CM

## 2024-02-29 DIAGNOSIS — M51.26 OTHER INTERVERTEBRAL DISC DISPLACEMENT, LUMBAR REGION: ICD-10-CM

## 2024-02-29 DIAGNOSIS — I10 ESSENTIAL (PRIMARY) HYPERTENSION: ICD-10-CM

## 2024-02-29 PROCEDURE — 99214 OFFICE O/P EST MOD 30 MIN: CPT | Mod: 25

## 2024-02-29 PROCEDURE — 36415 COLL VENOUS BLD VENIPUNCTURE: CPT

## 2024-02-29 RX ORDER — NAPROXEN 500 MG/1
500 TABLET ORAL
Qty: 60 | Refills: 0 | Status: ACTIVE | COMMUNITY
Start: 2023-01-13 | End: 1900-01-01

## 2024-02-29 RX ORDER — LOSARTAN POTASSIUM 100 MG/1
100 TABLET, FILM COATED ORAL
Qty: 90 | Refills: 1 | Status: ACTIVE | COMMUNITY
Start: 2018-07-20 | End: 1900-01-01

## 2024-02-29 RX ORDER — AMLODIPINE BESYLATE 5 MG/1
5 TABLET ORAL
Qty: 90 | Refills: 1 | Status: ACTIVE | COMMUNITY
Start: 2021-02-10 | End: 1900-01-01

## 2024-02-29 RX ORDER — ALPRAZOLAM 0.25 MG/1
0.25 TABLET ORAL
Qty: 60 | Refills: 0 | Status: ACTIVE | COMMUNITY
Start: 2018-04-17 | End: 1900-01-01

## 2024-03-01 LAB
25(OH)D3 SERPL-MCNC: 30.4 NG/ML
ALBUMIN SERPL ELPH-MCNC: 4.4 G/DL
ALP BLD-CCNC: 63 U/L
ALT SERPL-CCNC: 20 U/L
ANION GAP SERPL CALC-SCNC: 10 MMOL/L
AST SERPL-CCNC: 18 U/L
BASOPHILS # BLD AUTO: 0.05 K/UL
BASOPHILS NFR BLD AUTO: 0.7 %
BILIRUB SERPL-MCNC: 0.6 MG/DL
BUN SERPL-MCNC: 12 MG/DL
CALCIUM SERPL-MCNC: 9.3 MG/DL
CHLORIDE SERPL-SCNC: 102 MMOL/L
CHOLEST SERPL-MCNC: 212 MG/DL
CO2 SERPL-SCNC: 27 MMOL/L
CREAT SERPL-MCNC: 0.76 MG/DL
EGFR: 92 ML/MIN/1.73M2
EOSINOPHIL # BLD AUTO: 0.22 K/UL
EOSINOPHIL NFR BLD AUTO: 3.1 %
GLUCOSE SERPL-MCNC: 81 MG/DL
HCT VFR BLD CALC: 38.6 %
HDLC SERPL-MCNC: 56 MG/DL
HGB BLD-MCNC: 12.5 G/DL
IMM GRANULOCYTES NFR BLD AUTO: 0.3 %
LDLC SERPL CALC-MCNC: 139 MG/DL
LYMPHOCYTES # BLD AUTO: 2.55 K/UL
LYMPHOCYTES NFR BLD AUTO: 35.6 %
MAN DIFF?: NORMAL
MCHC RBC-ENTMCNC: 31.1 PG
MCHC RBC-ENTMCNC: 32.4 GM/DL
MCV RBC AUTO: 96 FL
MONOCYTES # BLD AUTO: 0.65 K/UL
MONOCYTES NFR BLD AUTO: 9.1 %
NEUTROPHILS # BLD AUTO: 3.68 K/UL
NEUTROPHILS NFR BLD AUTO: 51.2 %
NONHDLC SERPL-MCNC: 155 MG/DL
PLATELET # BLD AUTO: 274 K/UL
POTASSIUM SERPL-SCNC: 4.2 MMOL/L
PROT SERPL-MCNC: 6.9 G/DL
RBC # BLD: 4.02 M/UL
RBC # FLD: 12.8 %
SODIUM SERPL-SCNC: 139 MMOL/L
T4 FREE SERPL-MCNC: 1.5 NG/DL
TRIGL SERPL-MCNC: 89 MG/DL
TSH SERPL-ACNC: 1.48 UIU/ML
WBC # FLD AUTO: 7.17 K/UL

## 2024-03-29 ENCOUNTER — APPOINTMENT (OUTPATIENT)
Dept: ORTHOPEDIC SURGERY | Facility: CLINIC | Age: 59
End: 2024-03-29

## 2024-04-02 ENCOUNTER — APPOINTMENT (OUTPATIENT)
Dept: FAMILY MEDICINE | Facility: CLINIC | Age: 59
End: 2024-04-02

## 2024-04-12 ENCOUNTER — APPOINTMENT (OUTPATIENT)
Dept: PAIN MANAGEMENT | Facility: CLINIC | Age: 59
End: 2024-04-12
Payer: OTHER MISCELLANEOUS

## 2024-04-12 VITALS — HEIGHT: 67 IN | WEIGHT: 170 LBS | BODY MASS INDEX: 26.68 KG/M2

## 2024-04-12 DIAGNOSIS — M54.12 RADICULOPATHY, CERVICAL REGION: ICD-10-CM

## 2024-04-12 DIAGNOSIS — M62.838 OTHER MUSCLE SPASM: ICD-10-CM

## 2024-04-12 PROCEDURE — 99244 OFF/OP CNSLTJ NEW/EST MOD 40: CPT

## 2024-05-02 NOTE — PHYSICAL EXAM
[Rotation to left] : rotation to left [de-identified] : Constitutional; Appears well, no apparent distress Ability to communicate: Normal  Respiratory: non-labored breathing Skin: No rash noted Head: Normocephalic, atraumatic Neck: no visible thyroid enlargement Eyes: Extraocular movements intact Neurologic: Alert and oriented x3 Psychiatric: normal mood, affect and behavior [] : negative [place]
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
## 2024-05-02 NOTE — DISCUSSION/SUMMARY
[de-identified] : After discussing various treatment options with the patient including but not limited to oral medications, physical therapy, exercise modalities as well as interventional spinal injections, we have decided with the following plan:  - Continue Home exercises, stretching, activity modification, physical therapy, and conservative care. - MRI report and/or images was reviewed and discussed with the patient. - Recommend C7-T1 Cervical Epidural Steroid Injection under fluoroscopic guidance with image. - The risks, benefits and alternatives of the proposed procedure were explained in detail with the patient. The risks outlined include but are not limited to infection, bleeding, post-dural puncture headache, nerve injury, a temporary increase in pain, failure to resolve symptoms, allergic reaction, symptom recurrence, and possible elevation of blood sugar in diabetics. All questions were answered to patient's apparent satisfaction and he/she verbalized an understanding. - Patient is presenting with acute/sub-acute radicular pain with impairment in ADLs and functionality.  The pain has not responded to conservative care including NSAID therapy and/or physical therapy.  There is no bleeding tendency, unstable medical condition, or systemic infection. - Follow up in 1-2 weeks post injection for re-evaluation. *pt takes 81mg Aspirin - Will provide prescription for Physical Therapy.  Addendum 05/02/2024: Pt has failed conservative treatment consisting of HEP, Trigger point injections, heating pad, Tylenol and NSAIDs. WC denying physical therapy. MRI Cervical Spine showing Bulge C4-5 with R NF stenosis; C5-6 HNP with abutment of cord and mild L NF stenosis; C6-7 R HNP with NF stenosis.

## 2024-05-17 ENCOUNTER — APPOINTMENT (OUTPATIENT)
Dept: ORTHOPEDIC SURGERY | Facility: CLINIC | Age: 59
End: 2024-05-17

## 2024-05-27 ENCOUNTER — RX RENEWAL (OUTPATIENT)
Age: 59
End: 2024-05-27

## 2024-05-27 RX ORDER — LEVOTHYROXINE SODIUM 0.05 MG/1
50 TABLET ORAL DAILY
Qty: 30 | Refills: 2 | Status: ACTIVE | COMMUNITY
Start: 2023-11-28 | End: 1900-01-01

## 2024-06-12 NOTE — HEALTH RISK ASSESSMENT
Spiritual Care Visit    Clinical Encounter Type  Visited With: Patient  Routine Visit: Introduction  Continue Visiting: Yes         Values/Beliefs  Spiritual Requests During Hospitalization: Anointing & a small piece of Communion today    Sacramental Encounters  Communion: Patient wants communion  Communion Given Indicator: Yes  Sacrament of Sick-Anointing: Anointed     Scotty Vasquez                                  [No falls in past year] : Patient reported no falls in the past year [0] : 2) Feeling down, depressed, or hopeless: Not at all (0) [] : No [[id number]] : 0

## 2024-06-27 ENCOUNTER — APPOINTMENT (OUTPATIENT)
Dept: ENDOCRINOLOGY | Facility: CLINIC | Age: 59
End: 2024-06-27
Payer: COMMERCIAL

## 2024-06-27 VITALS
OXYGEN SATURATION: 99 % | DIASTOLIC BLOOD PRESSURE: 81 MMHG | HEART RATE: 84 BPM | BODY MASS INDEX: 26.84 KG/M2 | SYSTOLIC BLOOD PRESSURE: 126 MMHG | HEIGHT: 67 IN | WEIGHT: 171 LBS

## 2024-06-27 DIAGNOSIS — E03.9 HYPOTHYROIDISM, UNSPECIFIED: ICD-10-CM

## 2024-06-27 DIAGNOSIS — E21.3 HYPERPARATHYROIDISM, UNSPECIFIED: ICD-10-CM

## 2024-06-27 DIAGNOSIS — M85.80 OTHER SPECIFIED DISORDERS OF BONE DENSITY AND STRUCTURE, UNSPECIFIED SITE: ICD-10-CM

## 2024-06-27 PROCEDURE — 36415 COLL VENOUS BLD VENIPUNCTURE: CPT

## 2024-06-27 PROCEDURE — 99214 OFFICE O/P EST MOD 30 MIN: CPT | Mod: 25

## 2024-06-28 LAB
24R-OH-CALCIDIOL SERPL-MCNC: 60.8 PG/ML
CALCIUM SERPL-MCNC: 9.7 MG/DL
PARATHYROID HORMONE INTACT: 27 PG/ML
T4 FREE SERPL-MCNC: 1.4 NG/DL
T4 SERPL-MCNC: 7.9 UG/DL
TSH SERPL-ACNC: 1.15 UIU/ML

## 2024-07-01 NOTE — H&P PST ADULT - NS PRO FEM REPRO PAP RESULTS
Fax received at [facility]    Plan does not cover this medication    LISDEXAMFETAMINE 30MG CAPSULES    Either call plan at [phone] to initiate  PA or fax/call pharmacy to change medication.  Patient ID # [id number]    Fax back to [phone] (paperwork in mailbox) 1 page  
Medication Name: Vyvanse    Covered by insurance,- [place] Medicaid     Pharmacy to bill for brand and attached diagnosis code         Medication Prior Authorization team will close this encounter    
PA requested and forwarded to prior auth pool.     
normal

## 2024-08-06 ENCOUNTER — APPOINTMENT (OUTPATIENT)
Dept: FAMILY MEDICINE | Facility: CLINIC | Age: 59
End: 2024-08-06

## 2024-08-06 PROCEDURE — 99396 PREV VISIT EST AGE 40-64: CPT

## 2024-08-06 PROCEDURE — 36415 COLL VENOUS BLD VENIPUNCTURE: CPT

## 2024-08-19 ENCOUNTER — APPOINTMENT (OUTPATIENT)
Dept: UROGYNECOLOGY | Facility: CLINIC | Age: 59
End: 2024-08-19
Payer: COMMERCIAL

## 2024-08-19 VITALS
HEIGHT: 67 IN | HEART RATE: 72 BPM | DIASTOLIC BLOOD PRESSURE: 90 MMHG | WEIGHT: 172 LBS | BODY MASS INDEX: 27 KG/M2 | SYSTOLIC BLOOD PRESSURE: 146 MMHG

## 2024-08-19 DIAGNOSIS — R30.0 DYSURIA: ICD-10-CM

## 2024-08-19 DIAGNOSIS — N95.2 POSTMENOPAUSAL ATROPHIC VAGINITIS: ICD-10-CM

## 2024-08-19 DIAGNOSIS — M53.9 DORSOPATHY, UNSPECIFIED: ICD-10-CM

## 2024-08-19 DIAGNOSIS — N95.8 OTHER SPECIFIED MENOPAUSAL AND PERIMENOPAUSAL DISORDERS: ICD-10-CM

## 2024-08-19 DIAGNOSIS — R35.0 FREQUENCY OF MICTURITION: ICD-10-CM

## 2024-08-19 DIAGNOSIS — N39.0 URINARY TRACT INFECTION, SITE NOT SPECIFIED: ICD-10-CM

## 2024-08-19 LAB
BILIRUB UR QL STRIP: NORMAL
CLARITY UR: CLEAR
COLLECTION METHOD: NORMAL
GLUCOSE UR-MCNC: NORMAL
HCG UR QL: 0.2 EU/DL
HGB UR QL STRIP.AUTO: NORMAL
KETONES UR-MCNC: NORMAL
LEUKOCYTE ESTERASE UR QL STRIP: NORMAL
NITRITE UR QL STRIP: NORMAL
PH UR STRIP: 6.5
PROT UR STRIP-MCNC: NORMAL
SP GR UR STRIP: 1.01

## 2024-08-19 PROCEDURE — 51701 INSERT BLADDER CATHETER: CPT

## 2024-08-19 PROCEDURE — 99204 OFFICE O/P NEW MOD 45 MIN: CPT | Mod: 25

## 2024-08-19 PROCEDURE — 99459 PELVIC EXAMINATION: CPT

## 2024-08-19 RX ORDER — ESTRADIOL 0.1 MG/G
0.1 CREAM VAGINAL
Qty: 1 | Refills: 4 | Status: ACTIVE | COMMUNITY
Start: 2024-08-19 | End: 1900-01-01

## 2024-08-19 NOTE — DISCUSSION/SUMMARY
[FreeTextEntry1] :  Exam findings reviewed. Symptoms reviewed. We discussed GUSM and reviewed r/b/a of low dose vaginal estrogen. I recommend she start low dose vaginal estrogen and discussed the available data on VE use in patients with a h/o breast ca. Rx provided with instructions for use.    The following treatment plan was designed for this patient and provided to her in written form and reviewed extensively. Patient was given a copy to take home:   Start low dose vaginal estrogen: Estradiol vaginal cream. It comes in an applicator that is inserted into the vagina at bedtime. Fill up the applicator with estrogen cream to the 1 gram line. While lying in bed, gently insert the pre-filled applicator into the vagina ~2 insides inside the vagina. Hit the button to release the cream into the vagina. Let the cream sit inside the vagina overnight where it will absorb.       Directions: Use every night for 2 weeks (loading dose), then decrease to twice weekly at bedtime (Mon/Thursday). When you refill medication, only do twice weekly.       If not covered by insurance, call insurance company and get names of vaginal estrogen formulations that are covered. Call my office Mon-Fri with names of medication and we will switch to a form that's covered

## 2024-08-19 NOTE — PHYSICAL EXAM
[Chaperone Present] : A chaperone was present in the examining room during all aspects of the physical examination [23795] : A chaperone was present during the pelvic exam. [Vulvar Atrophy] : vulvar atrophy [Labia Majora] : were normal [Atrophy] : atrophy [Dry Mucosa] : dry mucosa [No Bleeding] : there was no active vaginal bleeding [Normal] : no abnormalities [Normal rectal exam] : was normal [FreeTextEntry2] : Miracle Brannon [FreeTextEntry1] : General: Well, appearing. Alert and orientated. No acute distress HEENT: Normocephalic, atraumatic and extraocular muscles appear to be intact Neck: Full range of motion, no obvious lymphadenopathy, deformities, or masses noted Respiratory: Speaking in full sentences comfortably, normal work of breathing and no cough during visit Musculoskeletal: active full range of motion in extremities Extremities: No upper extremity edema noted Skin: no obvious rash or skin lesions Neuro: Orientated X 3, speech is fluent, normal rate Psych: Normal mood and affect. [Tenderness] : ~T no ~M abdominal tenderness observed [Distended] : not distended [FreeTextEntry4] : severe atrophy

## 2024-08-19 NOTE — HISTORY OF PRESENT ILLNESS
[Cystocele (Obstetric)] : mild [Stress Incontinence] : no [Unable To Restrain Bowel Movement] : no [Urinary Frequency] : mild [Urinary Tract Infection] : no [x1] : nocturia once nightly [Hematuria] : no [Constipation Obstructed Defecation] : mild [Vaginal Pain] : no [Pelvic Pain] : no [Rectal Pain] : no [Sexual Dysfunction, NOS] : moderate [FreeTextEntry1] : PMH: G0, hypothyroidism, hyperparathyroidism, high cholesterol, L breast ca s/p chemotherapy, early menopause age 46 PSH: lumbar spinal fusion 2014, breast lumpectomy w/ SLNB, parathyroidectomy  Daily fluid intake: 4-5 bottles of water a day, 1-2 cups coffee, occasional seltzer  had UTI symptoms in Feb, March, May - went to urgent care, cultures all negative (took Nitrofurantoin, Clavurox, Cipro), taking probiotic. She reports symptoms include dysuria, bladder pressure and increased frequency.

## 2024-08-20 ENCOUNTER — NON-APPOINTMENT (OUTPATIENT)
Age: 59
End: 2024-08-20

## 2024-08-20 LAB
APPEARANCE: CLEAR
BACTERIA: NEGATIVE /HPF
BILIRUBIN URINE: NEGATIVE
BLOOD URINE: NEGATIVE
CAST: 0 /LPF
COLOR: YELLOW
EPITHELIAL CELLS: 0 /HPF
GLUCOSE QUALITATIVE U: NEGATIVE MG/DL
KETONES URINE: NEGATIVE MG/DL
LEUKOCYTE ESTERASE URINE: NEGATIVE
MICROSCOPIC-UA: NORMAL
NITRITE URINE: NEGATIVE
PH URINE: 7
PROTEIN URINE: NEGATIVE MG/DL
RED BLOOD CELLS URINE: 0 /HPF
SPECIFIC GRAVITY URINE: 1.01
UROBILINOGEN URINE: 0.2 MG/DL
WHITE BLOOD CELLS URINE: 0 /HPF

## 2024-08-21 LAB — BACTERIA UR CULT: NORMAL

## 2024-08-26 ENCOUNTER — RX RENEWAL (OUTPATIENT)
Age: 59
End: 2024-08-26

## 2024-09-04 ENCOUNTER — APPOINTMENT (OUTPATIENT)
Dept: FAMILY MEDICINE | Facility: CLINIC | Age: 59
End: 2024-09-04

## 2024-12-30 ENCOUNTER — APPOINTMENT (OUTPATIENT)
Dept: ENDOCRINOLOGY | Facility: CLINIC | Age: 59
End: 2024-12-30
Payer: COMMERCIAL

## 2024-12-30 VITALS
SYSTOLIC BLOOD PRESSURE: 136 MMHG | DIASTOLIC BLOOD PRESSURE: 85 MMHG | WEIGHT: 174 LBS | OXYGEN SATURATION: 98 % | HEART RATE: 74 BPM | HEIGHT: 67 IN | BODY MASS INDEX: 27.31 KG/M2

## 2024-12-30 DIAGNOSIS — E83.52 HYPERCALCEMIA: ICD-10-CM

## 2024-12-30 DIAGNOSIS — E03.9 HYPOTHYROIDISM, UNSPECIFIED: ICD-10-CM

## 2024-12-30 DIAGNOSIS — R79.89 OTHER SPECIFIED ABNORMAL FINDINGS OF BLOOD CHEMISTRY: ICD-10-CM

## 2024-12-30 DIAGNOSIS — I10 ESSENTIAL (PRIMARY) HYPERTENSION: ICD-10-CM

## 2024-12-30 DIAGNOSIS — Z78.0 ASYMPTOMATIC MENOPAUSAL STATE: ICD-10-CM

## 2024-12-30 DIAGNOSIS — E21.3 HYPERPARATHYROIDISM, UNSPECIFIED: ICD-10-CM

## 2024-12-30 PROCEDURE — 99214 OFFICE O/P EST MOD 30 MIN: CPT

## 2024-12-30 PROCEDURE — 36415 COLL VENOUS BLD VENIPUNCTURE: CPT

## 2024-12-31 LAB
24R-OH-CALCIDIOL SERPL-MCNC: 45.5 PG/ML
25(OH)D3 SERPL-MCNC: 31.7 NG/ML
ALBUMIN SERPL ELPH-MCNC: 4.6 G/DL
ALP BLD-CCNC: 67 U/L
ALT SERPL-CCNC: 19 U/L
ANION GAP SERPL CALC-SCNC: 17 MMOL/L
AST SERPL-CCNC: 19 U/L
BASOPHILS # BLD AUTO: 0.05 K/UL
BASOPHILS NFR BLD AUTO: 0.8 %
BILIRUB SERPL-MCNC: 0.6 MG/DL
BUN SERPL-MCNC: 13 MG/DL
CALCIUM SERPL-MCNC: 9.6 MG/DL
CALCIUM SERPL-MCNC: 9.6 MG/DL
CHLORIDE SERPL-SCNC: 100 MMOL/L
CHOLEST SERPL-MCNC: 218 MG/DL
CO2 SERPL-SCNC: 25 MMOL/L
CREAT SERPL-MCNC: 0.74 MG/DL
EGFR: 94 ML/MIN/1.73M2
EOSINOPHIL # BLD AUTO: 0.17 K/UL
EOSINOPHIL NFR BLD AUTO: 2.8 %
ESTIMATED AVERAGE GLUCOSE: 114 MG/DL
FSH SERPL-MCNC: 74.5 IU/L
GLUCOSE SERPL-MCNC: 98 MG/DL
HBA1C MFR BLD HPLC: 5.6 %
HCT VFR BLD CALC: 40.3 %
HDLC SERPL-MCNC: 60 MG/DL
HGB BLD-MCNC: 13 G/DL
IMM GRANULOCYTES NFR BLD AUTO: 0.2 %
LDLC SERPL CALC-MCNC: 146 MG/DL
LH SERPL-ACNC: 36 IU/L
LYMPHOCYTES # BLD AUTO: 2.5 K/UL
LYMPHOCYTES NFR BLD AUTO: 41.2 %
MAGNESIUM SERPL-MCNC: 1.9 MG/DL
MAN DIFF?: NORMAL
MCHC RBC-ENTMCNC: 30.8 PG
MCHC RBC-ENTMCNC: 32.3 G/DL
MCV RBC AUTO: 95.5 FL
MONOCYTES # BLD AUTO: 0.47 K/UL
MONOCYTES NFR BLD AUTO: 7.7 %
NEUTROPHILS # BLD AUTO: 2.87 K/UL
NEUTROPHILS NFR BLD AUTO: 47.3 %
NONHDLC SERPL-MCNC: 158 MG/DL
PARATHYROID HORMONE INTACT: 44 PG/ML
PHOSPHATE SERPL-MCNC: 4.1 MG/DL
PLATELET # BLD AUTO: 247 K/UL
POTASSIUM SERPL-SCNC: 4.3 MMOL/L
PROLACTIN SERPL-MCNC: 7.3 NG/ML
PROT SERPL-MCNC: 7.4 G/DL
RBC # BLD: 4.22 M/UL
RBC # FLD: 12.7 %
SODIUM SERPL-SCNC: 142 MMOL/L
T4 FREE SERPL-MCNC: 1.3 NG/DL
T4 SERPL-MCNC: 7.8 UG/DL
TRIGL SERPL-MCNC: 70 MG/DL
TSH SERPL-ACNC: 2.59 UIU/ML
WBC # FLD AUTO: 6.07 K/UL

## 2025-01-03 LAB
DOPAMINE UR-MCNC: <30 PG/ML
EPINEPH UR-MCNC: 44 PG/ML
METANEPHRINE, PL: 35.9 PG/ML
NOREPINEPH UR-MCNC: 458 PG/ML
NORMETANEPHRINE, PL: 128.1 PG/ML

## 2025-01-06 ENCOUNTER — APPOINTMENT (OUTPATIENT)
Dept: UROGYNECOLOGY | Facility: CLINIC | Age: 60
End: 2025-01-06

## 2025-01-24 ENCOUNTER — APPOINTMENT (OUTPATIENT)
Dept: PAIN MANAGEMENT | Facility: CLINIC | Age: 60
End: 2025-01-24
Payer: OTHER MISCELLANEOUS

## 2025-01-24 VITALS — WEIGHT: 170 LBS | HEIGHT: 67 IN | BODY MASS INDEX: 26.68 KG/M2

## 2025-01-24 DIAGNOSIS — M79.18 MYALGIA, OTHER SITE: ICD-10-CM

## 2025-01-24 DIAGNOSIS — M54.12 RADICULOPATHY, CERVICAL REGION: ICD-10-CM

## 2025-01-24 PROCEDURE — 99204 OFFICE O/P NEW MOD 45 MIN: CPT

## 2025-01-24 RX ORDER — NAPROXEN 500 MG/1
500 TABLET, DELAYED RELEASE ORAL
Qty: 60 | Refills: 0 | Status: ACTIVE | COMMUNITY
Start: 2025-01-24 | End: 1900-01-01

## 2025-02-03 ENCOUNTER — APPOINTMENT (OUTPATIENT)
Dept: FAMILY MEDICINE | Facility: CLINIC | Age: 60
End: 2025-02-03
Payer: COMMERCIAL

## 2025-02-03 VITALS
HEIGHT: 67 IN | TEMPERATURE: 97.9 F | DIASTOLIC BLOOD PRESSURE: 85 MMHG | BODY MASS INDEX: 26.68 KG/M2 | WEIGHT: 170 LBS | HEART RATE: 75 BPM | OXYGEN SATURATION: 99 % | RESPIRATION RATE: 16 BRPM | SYSTOLIC BLOOD PRESSURE: 134 MMHG

## 2025-02-03 DIAGNOSIS — I10 ESSENTIAL (PRIMARY) HYPERTENSION: ICD-10-CM

## 2025-02-03 DIAGNOSIS — F41.9 ANXIETY DISORDER, UNSPECIFIED: ICD-10-CM

## 2025-02-03 DIAGNOSIS — E78.00 PURE HYPERCHOLESTEROLEMIA, UNSPECIFIED: ICD-10-CM

## 2025-02-03 PROCEDURE — 99214 OFFICE O/P EST MOD 30 MIN: CPT

## 2025-03-23 ENCOUNTER — NON-APPOINTMENT (OUTPATIENT)
Age: 60
End: 2025-03-23

## 2025-04-18 ENCOUNTER — APPOINTMENT (OUTPATIENT)
Dept: FAMILY MEDICINE | Facility: CLINIC | Age: 60
End: 2025-04-18
Payer: COMMERCIAL

## 2025-04-18 VITALS
BODY MASS INDEX: 25.74 KG/M2 | HEIGHT: 67 IN | WEIGHT: 164 LBS | SYSTOLIC BLOOD PRESSURE: 138 MMHG | HEART RATE: 79 BPM | OXYGEN SATURATION: 98 % | RESPIRATION RATE: 16 BRPM | DIASTOLIC BLOOD PRESSURE: 82 MMHG | TEMPERATURE: 97.7 F

## 2025-04-18 DIAGNOSIS — R53.83 OTHER FATIGUE: ICD-10-CM

## 2025-04-18 DIAGNOSIS — R19.7 DIARRHEA, UNSPECIFIED: ICD-10-CM

## 2025-04-18 DIAGNOSIS — E03.9 HYPOTHYROIDISM, UNSPECIFIED: ICD-10-CM

## 2025-04-18 DIAGNOSIS — R52 PAIN, UNSPECIFIED: ICD-10-CM

## 2025-04-18 PROCEDURE — 36415 COLL VENOUS BLD VENIPUNCTURE: CPT

## 2025-04-18 PROCEDURE — 99214 OFFICE O/P EST MOD 30 MIN: CPT

## 2025-04-19 LAB
ALBUMIN SERPL ELPH-MCNC: 4.4 G/DL
ALP BLD-CCNC: 81 U/L
ALT SERPL-CCNC: 36 U/L
ANION GAP SERPL CALC-SCNC: 14 MMOL/L
AST SERPL-CCNC: 20 U/L
BASOPHILS # BLD AUTO: 0.04 K/UL
BASOPHILS NFR BLD AUTO: 0.5 %
BILIRUB SERPL-MCNC: 0.8 MG/DL
BUN SERPL-MCNC: 12 MG/DL
CALCIUM SERPL-MCNC: 9.2 MG/DL
CHLORIDE SERPL-SCNC: 101 MMOL/L
CO2 SERPL-SCNC: 25 MMOL/L
CREAT SERPL-MCNC: 0.74 MG/DL
EGFRCR SERPLBLD CKD-EPI 2021: 94 ML/MIN/1.73M2
EOSINOPHIL # BLD AUTO: 0.34 K/UL
EOSINOPHIL NFR BLD AUTO: 4.3 %
GLUCOSE SERPL-MCNC: 103 MG/DL
HCT VFR BLD CALC: 37.6 %
HGB BLD-MCNC: 12.4 G/DL
IMM GRANULOCYTES NFR BLD AUTO: 0.1 %
LYMPHOCYTES # BLD AUTO: 2.09 K/UL
LYMPHOCYTES NFR BLD AUTO: 26.6 %
MAN DIFF?: NORMAL
MCHC RBC-ENTMCNC: 30.6 PG
MCHC RBC-ENTMCNC: 33 G/DL
MCV RBC AUTO: 92.8 FL
MONOCYTES # BLD AUTO: 0.89 K/UL
MONOCYTES NFR BLD AUTO: 11.3 %
NEUTROPHILS # BLD AUTO: 4.48 K/UL
NEUTROPHILS NFR BLD AUTO: 57.2 %
PLATELET # BLD AUTO: 214 K/UL
POTASSIUM SERPL-SCNC: 3.8 MMOL/L
PROT SERPL-MCNC: 7.4 G/DL
RBC # BLD: 4.05 M/UL
RBC # FLD: 12.1 %
SODIUM SERPL-SCNC: 140 MMOL/L
TSH SERPL-ACNC: 0.79 UIU/ML
WBC # FLD AUTO: 7.85 K/UL

## 2025-04-29 ENCOUNTER — TRANSCRIPTION ENCOUNTER (OUTPATIENT)
Age: 60
End: 2025-04-29

## 2025-05-01 ENCOUNTER — APPOINTMENT (OUTPATIENT)
Dept: ENDOCRINOLOGY | Facility: CLINIC | Age: 60
End: 2025-05-01

## 2025-05-01 VITALS
HEART RATE: 72 BPM | DIASTOLIC BLOOD PRESSURE: 84 MMHG | SYSTOLIC BLOOD PRESSURE: 124 MMHG | WEIGHT: 163 LBS | OXYGEN SATURATION: 97 % | HEIGHT: 67 IN | BODY MASS INDEX: 25.58 KG/M2

## 2025-05-01 DIAGNOSIS — E21.3 HYPERPARATHYROIDISM, UNSPECIFIED: ICD-10-CM

## 2025-05-01 DIAGNOSIS — M85.80 OTHER SPECIFIED DISORDERS OF BONE DENSITY AND STRUCTURE, UNSPECIFIED SITE: ICD-10-CM

## 2025-05-01 DIAGNOSIS — R79.89 OTHER SPECIFIED ABNORMAL FINDINGS OF BLOOD CHEMISTRY: ICD-10-CM

## 2025-05-01 DIAGNOSIS — E83.52 HYPERCALCEMIA: ICD-10-CM

## 2025-05-01 DIAGNOSIS — E03.9 HYPOTHYROIDISM, UNSPECIFIED: ICD-10-CM

## 2025-05-01 DIAGNOSIS — I10 ESSENTIAL (PRIMARY) HYPERTENSION: ICD-10-CM

## 2025-05-01 PROCEDURE — 99214 OFFICE O/P EST MOD 30 MIN: CPT

## 2025-06-16 ENCOUNTER — APPOINTMENT (OUTPATIENT)
Dept: ORTHOPEDIC SURGERY | Facility: CLINIC | Age: 60
End: 2025-06-16

## 2025-06-16 PROCEDURE — 99204 OFFICE O/P NEW MOD 45 MIN: CPT | Mod: ACP

## 2025-06-16 PROCEDURE — 20552 NJX 1/MLT TRIGGER POINT 1/2: CPT

## 2025-06-16 PROCEDURE — J3490M: CUSTOM

## 2025-06-30 ENCOUNTER — APPOINTMENT (OUTPATIENT)
Dept: ENDOCRINOLOGY | Facility: CLINIC | Age: 60
End: 2025-06-30

## 2025-06-30 ENCOUNTER — APPOINTMENT (OUTPATIENT)
Dept: PAIN MANAGEMENT | Facility: CLINIC | Age: 60
End: 2025-06-30

## 2025-08-01 ENCOUNTER — APPOINTMENT (OUTPATIENT)
Dept: FAMILY MEDICINE | Facility: CLINIC | Age: 60
End: 2025-08-01

## 2025-08-06 ENCOUNTER — APPOINTMENT (OUTPATIENT)
Dept: FAMILY MEDICINE | Facility: CLINIC | Age: 60
End: 2025-08-06
Payer: COMMERCIAL

## 2025-08-06 VITALS
SYSTOLIC BLOOD PRESSURE: 134 MMHG | WEIGHT: 161.13 LBS | HEART RATE: 86 BPM | DIASTOLIC BLOOD PRESSURE: 83 MMHG | RESPIRATION RATE: 16 BRPM | HEIGHT: 67 IN | BODY MASS INDEX: 25.29 KG/M2 | OXYGEN SATURATION: 98 %

## 2025-08-06 DIAGNOSIS — I10 ESSENTIAL (PRIMARY) HYPERTENSION: ICD-10-CM

## 2025-08-06 DIAGNOSIS — E03.9 HYPOTHYROIDISM, UNSPECIFIED: ICD-10-CM

## 2025-08-06 PROCEDURE — 99214 OFFICE O/P EST MOD 30 MIN: CPT

## 2025-08-07 DIAGNOSIS — E78.00 PURE HYPERCHOLESTEROLEMIA, UNSPECIFIED: ICD-10-CM

## 2025-08-07 LAB
CHOLEST SERPL-MCNC: 235 MG/DL
HDLC SERPL-MCNC: 53 MG/DL
LDLC SERPL-MCNC: 163 MG/DL
NONHDLC SERPL-MCNC: 181 MG/DL
TRIGL SERPL-MCNC: 102 MG/DL

## 2025-08-07 RX ORDER — ATORVASTATIN CALCIUM 10 MG/1
10 TABLET, FILM COATED ORAL
Qty: 90 | Refills: 1 | Status: ACTIVE | COMMUNITY
Start: 2025-08-07 | End: 1900-01-01

## 2025-08-30 ENCOUNTER — NON-APPOINTMENT (OUTPATIENT)
Age: 60
End: 2025-08-30